# Patient Record
Sex: FEMALE | Race: BLACK OR AFRICAN AMERICAN | NOT HISPANIC OR LATINO | URBAN - METROPOLITAN AREA
[De-identification: names, ages, dates, MRNs, and addresses within clinical notes are randomized per-mention and may not be internally consistent; named-entity substitution may affect disease eponyms.]

---

## 2022-11-10 VITALS
HEART RATE: 55 BPM | OXYGEN SATURATION: 99 % | SYSTOLIC BLOOD PRESSURE: 151 MMHG | DIASTOLIC BLOOD PRESSURE: 70 MMHG | TEMPERATURE: 98 F | RESPIRATION RATE: 17 BRPM

## 2022-11-10 PROCEDURE — 93010 ELECTROCARDIOGRAM REPORT: CPT

## 2022-11-10 PROCEDURE — 99285 EMERGENCY DEPT VISIT HI MDM: CPT

## 2022-11-10 PROCEDURE — 99053 MED SERV 10PM-8AM 24 HR FAC: CPT

## 2022-11-10 NOTE — ED ADULT TRIAGE NOTE - CHIEF COMPLAINT QUOTE
Pt BIBA accompanied by daughter-in-law, pts daughter-in-law states pt has hx of dementia and is confused at baseline. Pts daughter-in-law reports pt was dropped off at her house last night to spend weekend with her but has not sleep for over 24 hours and has been increasingly confused and violet. Pt attempting to leaving daughter-in-law's apartment, daughter-in-law states "I didn't know what to do". Pt denies any pain or medical complaints.

## 2022-11-11 ENCOUNTER — INPATIENT (INPATIENT)
Facility: HOSPITAL | Age: 87
LOS: 5 days | Discharge: NURSING, REHAB OR LONG-TERM CARE | DRG: 177 | End: 2022-11-17
Admitting: STUDENT IN AN ORGANIZED HEALTH CARE EDUCATION/TRAINING PROGRAM
Payer: COMMERCIAL

## 2022-11-11 DIAGNOSIS — Z29.9 ENCOUNTER FOR PROPHYLACTIC MEASURES, UNSPECIFIED: ICD-10-CM

## 2022-11-11 DIAGNOSIS — I10 ESSENTIAL (PRIMARY) HYPERTENSION: ICD-10-CM

## 2022-11-11 DIAGNOSIS — U07.1 COVID-19: ICD-10-CM

## 2022-11-11 DIAGNOSIS — G30.9 ALZHEIMER'S DISEASE, UNSPECIFIED: ICD-10-CM

## 2022-11-11 DIAGNOSIS — G92.8 OTHER TOXIC ENCEPHALOPATHY: ICD-10-CM

## 2022-11-11 DIAGNOSIS — E78.5 HYPERLIPIDEMIA, UNSPECIFIED: ICD-10-CM

## 2022-11-11 DIAGNOSIS — I65.1 OCCLUSION AND STENOSIS OF BASILAR ARTERY: ICD-10-CM

## 2022-11-11 LAB
ALBUMIN SERPL ELPH-MCNC: 3.7 G/DL — SIGNIFICANT CHANGE UP (ref 3.3–5)
ALBUMIN SERPL ELPH-MCNC: 4.1 G/DL — SIGNIFICANT CHANGE UP (ref 3.4–5)
ALP SERPL-CCNC: 69 U/L — SIGNIFICANT CHANGE UP (ref 40–120)
ALP SERPL-CCNC: 88 U/L — SIGNIFICANT CHANGE UP (ref 40–120)
ALT FLD-CCNC: 9 U/L — LOW (ref 10–45)
ALT FLD-CCNC: 9 U/L — LOW (ref 12–42)
ANION GAP SERPL CALC-SCNC: 11 MMOL/L — SIGNIFICANT CHANGE UP (ref 5–17)
ANION GAP SERPL CALC-SCNC: 9 MMOL/L — SIGNIFICANT CHANGE UP (ref 9–16)
APAP SERPL-MCNC: <2 UG/ML — LOW (ref 10–30)
APPEARANCE UR: CLEAR — SIGNIFICANT CHANGE UP
APTT BLD: 27.7 SEC — SIGNIFICANT CHANGE UP (ref 27.5–35.5)
AST SERPL-CCNC: 17 U/L — SIGNIFICANT CHANGE UP (ref 15–37)
AST SERPL-CCNC: 18 U/L — SIGNIFICANT CHANGE UP (ref 10–40)
BASE EXCESS BLDA CALC-SCNC: 0.6 MMOL/L — SIGNIFICANT CHANGE UP (ref -2–3)
BASOPHILS # BLD AUTO: 0.01 K/UL — SIGNIFICANT CHANGE UP (ref 0–0.2)
BASOPHILS # BLD AUTO: 0.01 K/UL — SIGNIFICANT CHANGE UP (ref 0–0.2)
BASOPHILS NFR BLD AUTO: 0.2 % — SIGNIFICANT CHANGE UP (ref 0–2)
BASOPHILS NFR BLD AUTO: 0.3 % — SIGNIFICANT CHANGE UP (ref 0–2)
BILIRUB SERPL-MCNC: 0.8 MG/DL — SIGNIFICANT CHANGE UP (ref 0.2–1.2)
BILIRUB SERPL-MCNC: 0.9 MG/DL — SIGNIFICANT CHANGE UP (ref 0.2–1.2)
BILIRUB UR-MCNC: NEGATIVE — SIGNIFICANT CHANGE UP
BLD GP AB SCN SERPL QL: NEGATIVE — SIGNIFICANT CHANGE UP
BUN SERPL-MCNC: 13 MG/DL — SIGNIFICANT CHANGE UP (ref 7–23)
BUN SERPL-MCNC: 9 MG/DL — SIGNIFICANT CHANGE UP (ref 7–23)
CALCIUM SERPL-MCNC: 9.4 MG/DL — SIGNIFICANT CHANGE UP (ref 8.4–10.5)
CALCIUM SERPL-MCNC: 9.7 MG/DL — SIGNIFICANT CHANGE UP (ref 8.5–10.5)
CHLORIDE SERPL-SCNC: 107 MMOL/L — SIGNIFICANT CHANGE UP (ref 96–108)
CHLORIDE SERPL-SCNC: 110 MMOL/L — HIGH (ref 96–108)
CO2 BLDA-SCNC: 26 MMOL/L — HIGH (ref 19–24)
CO2 SERPL-SCNC: 24 MMOL/L — SIGNIFICANT CHANGE UP (ref 22–31)
CO2 SERPL-SCNC: 26 MMOL/L — SIGNIFICANT CHANGE UP (ref 22–31)
COLOR SPEC: YELLOW — SIGNIFICANT CHANGE UP
CREAT SERPL-MCNC: 0.73 MG/DL — SIGNIFICANT CHANGE UP (ref 0.5–1.3)
CREAT SERPL-MCNC: 0.87 MG/DL — SIGNIFICANT CHANGE UP (ref 0.5–1.3)
CRP SERPL-MCNC: <3 MG/L — SIGNIFICANT CHANGE UP (ref 0–4)
D DIMER BLD IA.RAPID-MCNC: 350 NG/ML DDU — HIGH
DIFF PNL FLD: ABNORMAL
EGFR: 65 ML/MIN/1.73M2 — SIGNIFICANT CHANGE UP
EGFR: 81 ML/MIN/1.73M2 — SIGNIFICANT CHANGE UP
EOSINOPHIL # BLD AUTO: 0.01 K/UL — SIGNIFICANT CHANGE UP (ref 0–0.5)
EOSINOPHIL # BLD AUTO: 0.04 K/UL — SIGNIFICANT CHANGE UP (ref 0–0.5)
EOSINOPHIL NFR BLD AUTO: 0.3 % — SIGNIFICANT CHANGE UP (ref 0–6)
EOSINOPHIL NFR BLD AUTO: 0.7 % — SIGNIFICANT CHANGE UP (ref 0–6)
FERRITIN SERPL-MCNC: 101 NG/ML — SIGNIFICANT CHANGE UP (ref 15–150)
GLUCOSE BLDC GLUCOMTR-MCNC: 115 MG/DL — HIGH (ref 70–99)
GLUCOSE BLDC GLUCOMTR-MCNC: 76 MG/DL — SIGNIFICANT CHANGE UP (ref 70–99)
GLUCOSE BLDC GLUCOMTR-MCNC: 80 MG/DL — SIGNIFICANT CHANGE UP (ref 70–99)
GLUCOSE SERPL-MCNC: 125 MG/DL — HIGH (ref 70–99)
GLUCOSE SERPL-MCNC: 95 MG/DL — SIGNIFICANT CHANGE UP (ref 70–99)
GLUCOSE UR QL: NEGATIVE — SIGNIFICANT CHANGE UP
HCO3 BLDA-SCNC: 24 MMOL/L — SIGNIFICANT CHANGE UP (ref 21–28)
HCT VFR BLD CALC: 31.7 % — LOW (ref 34.5–45)
HCT VFR BLD CALC: 38.1 % — SIGNIFICANT CHANGE UP (ref 34.5–45)
HGB BLD-MCNC: 10.6 G/DL — LOW (ref 11.5–15.5)
HGB BLD-MCNC: 12.6 G/DL — SIGNIFICANT CHANGE UP (ref 11.5–15.5)
HIV 1+2 AB+HIV1 P24 AG SERPL QL IA: SIGNIFICANT CHANGE UP
IMM GRANULOCYTES NFR BLD AUTO: 0 % — SIGNIFICANT CHANGE UP (ref 0–0.9)
IMM GRANULOCYTES NFR BLD AUTO: 0.2 % — SIGNIFICANT CHANGE UP (ref 0–0.9)
INR BLD: 1.16 — SIGNIFICANT CHANGE UP (ref 0.88–1.16)
KETONES UR-MCNC: NEGATIVE — SIGNIFICANT CHANGE UP
LACTATE SERPL-SCNC: 0.5 MMOL/L — SIGNIFICANT CHANGE UP (ref 0.5–2)
LDH SERPL L TO P-CCNC: 249 U/L — HIGH (ref 50–242)
LEUKOCYTE ESTERASE UR-ACNC: NEGATIVE — SIGNIFICANT CHANGE UP
LYMPHOCYTES # BLD AUTO: 1.2 K/UL — SIGNIFICANT CHANGE UP (ref 1–3.3)
LYMPHOCYTES # BLD AUTO: 2.41 K/UL — SIGNIFICANT CHANGE UP (ref 1–3.3)
LYMPHOCYTES # BLD AUTO: 35.6 % — SIGNIFICANT CHANGE UP (ref 13–44)
LYMPHOCYTES # BLD AUTO: 40.3 % — SIGNIFICANT CHANGE UP (ref 13–44)
MAGNESIUM SERPL-MCNC: 1.9 MG/DL — SIGNIFICANT CHANGE UP (ref 1.6–2.6)
MCHC RBC-ENTMCNC: 31 PG — SIGNIFICANT CHANGE UP (ref 27–34)
MCHC RBC-ENTMCNC: 31.1 PG — SIGNIFICANT CHANGE UP (ref 27–34)
MCHC RBC-ENTMCNC: 33.1 GM/DL — SIGNIFICANT CHANGE UP (ref 32–36)
MCHC RBC-ENTMCNC: 33.4 GM/DL — SIGNIFICANT CHANGE UP (ref 32–36)
MCV RBC AUTO: 93 FL — SIGNIFICANT CHANGE UP (ref 80–100)
MCV RBC AUTO: 93.6 FL — SIGNIFICANT CHANGE UP (ref 80–100)
MONOCYTES # BLD AUTO: 0.27 K/UL — SIGNIFICANT CHANGE UP (ref 0–0.9)
MONOCYTES # BLD AUTO: 0.44 K/UL — SIGNIFICANT CHANGE UP (ref 0–0.9)
MONOCYTES NFR BLD AUTO: 7.4 % — SIGNIFICANT CHANGE UP (ref 2–14)
MONOCYTES NFR BLD AUTO: 8 % — SIGNIFICANT CHANGE UP (ref 2–14)
NEUTROPHILS # BLD AUTO: 1.88 K/UL — SIGNIFICANT CHANGE UP (ref 1.8–7.4)
NEUTROPHILS # BLD AUTO: 3.07 K/UL — SIGNIFICANT CHANGE UP (ref 1.8–7.4)
NEUTROPHILS NFR BLD AUTO: 51.2 % — SIGNIFICANT CHANGE UP (ref 43–77)
NEUTROPHILS NFR BLD AUTO: 55.8 % — SIGNIFICANT CHANGE UP (ref 43–77)
NITRITE UR-MCNC: NEGATIVE — SIGNIFICANT CHANGE UP
NRBC # BLD: 0 /100 WBCS — SIGNIFICANT CHANGE UP (ref 0–0)
NRBC # BLD: 0 /100 WBCS — SIGNIFICANT CHANGE UP (ref 0–0)
PCO2 BLDA: 36 MMHG — SIGNIFICANT CHANGE UP (ref 32–45)
PH BLDA: 7.44 — SIGNIFICANT CHANGE UP (ref 7.35–7.45)
PH UR: 7 — SIGNIFICANT CHANGE UP (ref 5–8)
PHOSPHATE SERPL-MCNC: 4 MG/DL — SIGNIFICANT CHANGE UP (ref 2.5–4.5)
PLATELET # BLD AUTO: 245 K/UL — SIGNIFICANT CHANGE UP (ref 150–400)
PLATELET # BLD AUTO: 284 K/UL — SIGNIFICANT CHANGE UP (ref 150–400)
PO2 BLDA: 99 MMHG — SIGNIFICANT CHANGE UP (ref 83–108)
POTASSIUM SERPL-MCNC: 3.9 MMOL/L — SIGNIFICANT CHANGE UP (ref 3.5–5.3)
POTASSIUM SERPL-MCNC: 3.9 MMOL/L — SIGNIFICANT CHANGE UP (ref 3.5–5.3)
POTASSIUM SERPL-SCNC: 3.9 MMOL/L — SIGNIFICANT CHANGE UP (ref 3.5–5.3)
POTASSIUM SERPL-SCNC: 3.9 MMOL/L — SIGNIFICANT CHANGE UP (ref 3.5–5.3)
PROT SERPL-MCNC: 6.5 G/DL — SIGNIFICANT CHANGE UP (ref 6–8.3)
PROT SERPL-MCNC: 8.2 G/DL — SIGNIFICANT CHANGE UP (ref 6.4–8.2)
PROT UR-MCNC: NEGATIVE MG/DL — SIGNIFICANT CHANGE UP
PROTHROM AB SERPL-ACNC: 13.8 SEC — HIGH (ref 10.5–13.4)
RBC # BLD: 3.41 M/UL — LOW (ref 3.8–5.2)
RBC # BLD: 4.07 M/UL — SIGNIFICANT CHANGE UP (ref 3.8–5.2)
RBC # FLD: 13.8 % — SIGNIFICANT CHANGE UP (ref 10.3–14.5)
RBC # FLD: 14.1 % — SIGNIFICANT CHANGE UP (ref 10.3–14.5)
RBC CASTS # UR COMP ASSIST: < 5 /HPF — SIGNIFICANT CHANGE UP
RH IG SCN BLD-IMP: NEGATIVE — SIGNIFICANT CHANGE UP
SALICYLATES SERPL-MCNC: 0.6 MG/DL — LOW (ref 2.8–20)
SAO2 % BLDA: 99.4 % — HIGH (ref 94–98)
SARS-COV-2 RNA SPEC QL NAA+PROBE: DETECTED
SODIUM SERPL-SCNC: 142 MMOL/L — SIGNIFICANT CHANGE UP (ref 132–145)
SODIUM SERPL-SCNC: 145 MMOL/L — SIGNIFICANT CHANGE UP (ref 135–145)
SP GR SPEC: 1.01 — SIGNIFICANT CHANGE UP (ref 1–1.03)
TROPONIN I, HIGH SENSITIVITY RESULT: 20.9 NG/L — SIGNIFICANT CHANGE UP
TSH SERPL-MCNC: 3.41 UIU/ML — SIGNIFICANT CHANGE UP (ref 0.27–4.2)
TSH SERPL-MCNC: 4.32 UIU/ML — HIGH (ref 0.27–4.2)
UROBILINOGEN FLD QL: 0.2 E.U./DL — SIGNIFICANT CHANGE UP
WBC # BLD: 3.37 K/UL — LOW (ref 3.8–10.5)
WBC # BLD: 5.98 K/UL — SIGNIFICANT CHANGE UP (ref 3.8–10.5)
WBC # FLD AUTO: 3.37 K/UL — LOW (ref 3.8–10.5)
WBC # FLD AUTO: 5.98 K/UL — SIGNIFICANT CHANGE UP (ref 3.8–10.5)
WBC UR QL: < 5 /HPF — SIGNIFICANT CHANGE UP

## 2022-11-11 PROCEDURE — 70496 CT ANGIOGRAPHY HEAD: CPT | Mod: 26

## 2022-11-11 PROCEDURE — 99291 CRITICAL CARE FIRST HOUR: CPT | Mod: GC

## 2022-11-11 PROCEDURE — 0042T: CPT

## 2022-11-11 PROCEDURE — 99223 1ST HOSP IP/OBS HIGH 75: CPT | Mod: GC

## 2022-11-11 PROCEDURE — 70498 CT ANGIOGRAPHY NECK: CPT | Mod: 26

## 2022-11-11 PROCEDURE — 70450 CT HEAD/BRAIN W/O DYE: CPT | Mod: 26,77,59

## 2022-11-11 PROCEDURE — 71045 X-RAY EXAM CHEST 1 VIEW: CPT | Mod: 26

## 2022-11-11 PROCEDURE — 70450 CT HEAD/BRAIN W/O DYE: CPT | Mod: 26,59

## 2022-11-11 RX ORDER — ENOXAPARIN SODIUM 100 MG/ML
30 INJECTION SUBCUTANEOUS EVERY 24 HOURS
Refills: 0 | Status: DISCONTINUED | OUTPATIENT
Start: 2022-11-11 | End: 2022-11-11

## 2022-11-11 RX ORDER — DEXTROSE 50 % IN WATER 50 %
25 SYRINGE (ML) INTRAVENOUS ONCE
Refills: 0 | Status: DISCONTINUED | OUTPATIENT
Start: 2022-11-11 | End: 2022-11-17

## 2022-11-11 RX ORDER — NIFEDIPINE 30 MG
30 TABLET, EXTENDED RELEASE 24 HR ORAL DAILY
Refills: 0 | Status: DISCONTINUED | OUTPATIENT
Start: 2022-11-11 | End: 2022-11-11

## 2022-11-11 RX ORDER — REMDESIVIR 5 MG/ML
100 INJECTION INTRAVENOUS EVERY 24 HOURS
Refills: 0 | Status: COMPLETED | OUTPATIENT
Start: 2022-11-12 | End: 2022-11-15

## 2022-11-11 RX ORDER — ATORVASTATIN CALCIUM 80 MG/1
40 TABLET, FILM COATED ORAL AT BEDTIME
Refills: 0 | Status: DISCONTINUED | OUTPATIENT
Start: 2022-11-11 | End: 2022-11-11

## 2022-11-11 RX ORDER — OLANZAPINE 15 MG/1
5 TABLET, FILM COATED ORAL ONCE
Refills: 0 | Status: COMPLETED | OUTPATIENT
Start: 2022-11-11 | End: 2022-11-11

## 2022-11-11 RX ORDER — METOPROLOL TARTRATE 50 MG
25 TABLET ORAL DAILY
Refills: 0 | Status: DISCONTINUED | OUTPATIENT
Start: 2022-11-11 | End: 2022-11-11

## 2022-11-11 RX ORDER — DONEPEZIL HYDROCHLORIDE 10 MG/1
1 TABLET, FILM COATED ORAL
Qty: 0 | Refills: 0 | DISCHARGE

## 2022-11-11 RX ORDER — ENOXAPARIN SODIUM 100 MG/ML
30 INJECTION SUBCUTANEOUS EVERY 24 HOURS
Refills: 0 | Status: DISCONTINUED | OUTPATIENT
Start: 2022-11-11 | End: 2022-11-17

## 2022-11-11 RX ORDER — MIDAZOLAM HYDROCHLORIDE 1 MG/ML
2 INJECTION, SOLUTION INTRAMUSCULAR; INTRAVENOUS ONCE
Refills: 0 | Status: DISCONTINUED | OUTPATIENT
Start: 2022-11-11 | End: 2022-11-11

## 2022-11-11 RX ORDER — HYDRALAZINE HCL 50 MG
10 TABLET ORAL ONCE
Refills: 0 | Status: COMPLETED | OUTPATIENT
Start: 2022-11-11 | End: 2022-11-11

## 2022-11-11 RX ORDER — HYDRALAZINE HCL 50 MG
10 TABLET ORAL EVERY 6 HOURS
Refills: 0 | Status: DISCONTINUED | OUTPATIENT
Start: 2022-11-11 | End: 2022-11-13

## 2022-11-11 RX ORDER — SODIUM CHLORIDE 9 MG/ML
1000 INJECTION INTRAMUSCULAR; INTRAVENOUS; SUBCUTANEOUS ONCE
Refills: 0 | Status: COMPLETED | OUTPATIENT
Start: 2022-11-11 | End: 2022-11-11

## 2022-11-11 RX ORDER — DEXTROSE 50 % IN WATER 50 %
12.5 SYRINGE (ML) INTRAVENOUS ONCE
Refills: 0 | Status: DISCONTINUED | OUTPATIENT
Start: 2022-11-11 | End: 2022-11-17

## 2022-11-11 RX ORDER — SODIUM CHLORIDE 9 MG/ML
1000 INJECTION, SOLUTION INTRAVENOUS
Refills: 0 | Status: DISCONTINUED | OUTPATIENT
Start: 2022-11-11 | End: 2022-11-17

## 2022-11-11 RX ORDER — OLANZAPINE 15 MG/1
2.5 TABLET, FILM COATED ORAL EVERY 6 HOURS
Refills: 0 | Status: DISCONTINUED | OUTPATIENT
Start: 2022-11-11 | End: 2022-11-11

## 2022-11-11 RX ORDER — LOSARTAN POTASSIUM 100 MG/1
1 TABLET, FILM COATED ORAL
Qty: 0 | Refills: 0 | DISCHARGE

## 2022-11-11 RX ORDER — DIPHENHYDRAMINE HCL 50 MG
25 CAPSULE ORAL ONCE
Refills: 0 | Status: COMPLETED | OUTPATIENT
Start: 2022-11-11 | End: 2022-11-11

## 2022-11-11 RX ORDER — DONEPEZIL HYDROCHLORIDE 10 MG/1
10 TABLET, FILM COATED ORAL AT BEDTIME
Refills: 0 | Status: DISCONTINUED | OUTPATIENT
Start: 2022-11-11 | End: 2022-11-11

## 2022-11-11 RX ORDER — GLUCAGON INJECTION, SOLUTION 0.5 MG/.1ML
1 INJECTION, SOLUTION SUBCUTANEOUS ONCE
Refills: 0 | Status: DISCONTINUED | OUTPATIENT
Start: 2022-11-11 | End: 2022-11-17

## 2022-11-11 RX ORDER — REMDESIVIR 5 MG/ML
200 INJECTION INTRAVENOUS EVERY 24 HOURS
Refills: 0 | Status: COMPLETED | OUTPATIENT
Start: 2022-11-11 | End: 2022-11-11

## 2022-11-11 RX ORDER — LANOLIN ALCOHOL/MO/W.PET/CERES
5 CREAM (GRAM) TOPICAL AT BEDTIME
Refills: 0 | Status: DISCONTINUED | OUTPATIENT
Start: 2022-11-11 | End: 2022-11-11

## 2022-11-11 RX ORDER — HALOPERIDOL DECANOATE 100 MG/ML
2 INJECTION INTRAMUSCULAR EVERY 6 HOURS
Refills: 0 | Status: DISCONTINUED | OUTPATIENT
Start: 2022-11-11 | End: 2022-11-11

## 2022-11-11 RX ORDER — METOPROLOL TARTRATE 50 MG
1 TABLET ORAL
Qty: 0 | Refills: 0 | DISCHARGE

## 2022-11-11 RX ORDER — LABETALOL HCL 100 MG
5 TABLET ORAL ONCE
Refills: 0 | Status: DISCONTINUED | OUTPATIENT
Start: 2022-11-11 | End: 2022-11-11

## 2022-11-11 RX ORDER — ASPIRIN/CALCIUM CARB/MAGNESIUM 324 MG
81 TABLET ORAL DAILY
Refills: 0 | Status: DISCONTINUED | OUTPATIENT
Start: 2022-11-11 | End: 2022-11-11

## 2022-11-11 RX ORDER — HALOPERIDOL DECANOATE 100 MG/ML
5 INJECTION INTRAMUSCULAR ONCE
Refills: 0 | Status: COMPLETED | OUTPATIENT
Start: 2022-11-11 | End: 2022-11-11

## 2022-11-11 RX ORDER — METOPROLOL TARTRATE 50 MG
5 TABLET ORAL EVERY 6 HOURS
Refills: 0 | Status: DISCONTINUED | OUTPATIENT
Start: 2022-11-11 | End: 2022-11-11

## 2022-11-11 RX ORDER — HYDRALAZINE HCL 50 MG
10 TABLET ORAL EVERY 6 HOURS
Refills: 0 | Status: DISCONTINUED | OUTPATIENT
Start: 2022-11-11 | End: 2022-11-11

## 2022-11-11 RX ORDER — METOPROLOL TARTRATE 50 MG
5 TABLET ORAL EVERY 6 HOURS
Refills: 0 | Status: DISCONTINUED | OUTPATIENT
Start: 2022-11-11 | End: 2022-11-13

## 2022-11-11 RX ORDER — ENOXAPARIN SODIUM 100 MG/ML
40 INJECTION SUBCUTANEOUS EVERY 24 HOURS
Refills: 0 | Status: DISCONTINUED | OUTPATIENT
Start: 2022-11-11 | End: 2022-11-11

## 2022-11-11 RX ORDER — ROSUVASTATIN CALCIUM 5 MG/1
1 TABLET ORAL
Qty: 0 | Refills: 0 | DISCHARGE

## 2022-11-11 RX ORDER — ACETAMINOPHEN 500 MG
650 TABLET ORAL EVERY 6 HOURS
Refills: 0 | Status: DISCONTINUED | OUTPATIENT
Start: 2022-11-11 | End: 2022-11-11

## 2022-11-11 RX ORDER — LOSARTAN POTASSIUM 100 MG/1
50 TABLET, FILM COATED ORAL DAILY
Refills: 0 | Status: DISCONTINUED | OUTPATIENT
Start: 2022-11-11 | End: 2022-11-11

## 2022-11-11 RX ORDER — REMDESIVIR 5 MG/ML
INJECTION INTRAVENOUS
Refills: 0 | Status: DISCONTINUED | OUTPATIENT
Start: 2022-11-11 | End: 2022-11-11

## 2022-11-11 RX ORDER — OLANZAPINE 15 MG/1
2.5 TABLET, FILM COATED ORAL EVERY 6 HOURS
Refills: 0 | Status: DISCONTINUED | OUTPATIENT
Start: 2022-11-11 | End: 2022-11-13

## 2022-11-11 RX ORDER — DEXTROSE 50 % IN WATER 50 %
15 SYRINGE (ML) INTRAVENOUS ONCE
Refills: 0 | Status: DISCONTINUED | OUTPATIENT
Start: 2022-11-11 | End: 2022-11-17

## 2022-11-11 RX ORDER — ATORVASTATIN CALCIUM 80 MG/1
80 TABLET, FILM COATED ORAL AT BEDTIME
Refills: 0 | Status: DISCONTINUED | OUTPATIENT
Start: 2022-11-11 | End: 2022-11-11

## 2022-11-11 RX ORDER — ASPIRIN/CALCIUM CARB/MAGNESIUM 324 MG
1 TABLET ORAL
Qty: 0 | Refills: 0 | DISCHARGE

## 2022-11-11 RX ORDER — REMDESIVIR 5 MG/ML
INJECTION INTRAVENOUS
Refills: 0 | Status: COMPLETED | OUTPATIENT
Start: 2022-11-11 | End: 2022-11-15

## 2022-11-11 RX ADMIN — Medication 25 MILLIGRAM(S): at 03:06

## 2022-11-11 RX ADMIN — Medication 10 MILLIGRAM(S): at 05:17

## 2022-11-11 RX ADMIN — Medication 5 MILLIGRAM(S): at 19:00

## 2022-11-11 RX ADMIN — OLANZAPINE 5 MILLIGRAM(S): 15 TABLET, FILM COATED ORAL at 02:04

## 2022-11-11 RX ADMIN — MIDAZOLAM HYDROCHLORIDE 2 MILLIGRAM(S): 1 INJECTION, SOLUTION INTRAMUSCULAR; INTRAVENOUS at 04:21

## 2022-11-11 RX ADMIN — OLANZAPINE 2.5 MILLIGRAM(S): 15 TABLET, FILM COATED ORAL at 18:50

## 2022-11-11 RX ADMIN — Medication 30 MILLIGRAM(S): at 13:30

## 2022-11-11 RX ADMIN — ENOXAPARIN SODIUM 30 MILLIGRAM(S): 100 INJECTION SUBCUTANEOUS at 21:32

## 2022-11-11 RX ADMIN — OLANZAPINE 5 MILLIGRAM(S): 15 TABLET, FILM COATED ORAL at 07:42

## 2022-11-11 RX ADMIN — SODIUM CHLORIDE 200 MILLILITER(S): 9 INJECTION INTRAMUSCULAR; INTRAVENOUS; SUBCUTANEOUS at 13:32

## 2022-11-11 RX ADMIN — HALOPERIDOL DECANOATE 5 MILLIGRAM(S): 100 INJECTION INTRAMUSCULAR at 03:06

## 2022-11-11 RX ADMIN — Medication 10 MILLIGRAM(S): at 17:27

## 2022-11-11 RX ADMIN — REMDESIVIR 200 MILLIGRAM(S): 5 INJECTION INTRAVENOUS at 21:33

## 2022-11-11 RX ADMIN — SODIUM CHLORIDE 70 MILLILITER(S): 9 INJECTION, SOLUTION INTRAVENOUS at 15:55

## 2022-11-11 RX ADMIN — Medication 10 MILLIGRAM(S): at 21:32

## 2022-11-11 RX ADMIN — OLANZAPINE 5 MILLIGRAM(S): 15 TABLET, FILM COATED ORAL at 01:10

## 2022-11-11 NOTE — PROGRESS NOTE ADULT - PROBLEM SELECTOR PLAN 4
Resume home meds: On Rosuvastatin   - Interchanged to Atorvastatin 40 mg daily Home meds: Metoprolol, Nifedipine, losartan    - Started on IV meds given AMS and NPO status     - IV hydral 10mg q6hrs     - IV lopressor 5mg q6hrs     - If BPs are uncontrolled, can give enalaprilate 0.625mg q6hrs (can increase to 1.25mg q6hrs if needed).

## 2022-11-11 NOTE — PROGRESS NOTE ADULT - PROBLEM SELECTOR PLAN 2
the patient is currently asymptomatic and has never been vaccinated against covid. She will need treatment as she is a high risk patient    - F/u Feritin, CRP and LDH  - Consider starting remdesivir for 3 days the patient is currently asymptomatic and has never been vaccinated against covid. She will need treatment as she is a high risk patient.    - F/u Ferritin, CRP and LDH  - Started on remdesivir

## 2022-11-11 NOTE — H&P ADULT - NSHPSOCIALHISTORY_GEN_ALL_CORE
The patient lives with her son. Able to ambulate on her own without assistance at baseline. She uses the bathroom, can bathe herself but cannot cook for herself and does not usually say when she is hungry, she eats and drinks when food and water is given are put in front of her.     She does not smoke, use drugs. She occasionally drinks one can of Budweiser.

## 2022-11-11 NOTE — ED PROVIDER NOTE - PHYSICAL EXAMINATION
Physical Exam    Vital Signs: I have reviewed the initial vital signs.  Constitutional: well-nourished, appears stated age, no acute distress  Eyes: PERRLA, and symmetrical lids.  ENT: Neck supple with no adenopathy, moist MM.  Cardiovascular: regular rate, regular rhythm, well-perfused extremities  Respiratory: unlabored respiratory effort, clear to auscultation bilaterally  Gastrointestinal: soft, non-tender abdomen, no pulsatile mass  Musculoskeletal: supple neck, no lower extremity edema  Integumentary: warm, dry, no rash  Neurologic: awake, alert, cranial nerves II-XII grossly intact, extremities’ motor and sensory functions grossly intact  Psychiatric: A&Ox0

## 2022-11-11 NOTE — PROGRESS NOTE ADULT - PROBLEM SELECTOR PLAN 5
Resume home meds: Donepizil   - C/w donepizil 10 mg daily at bedtime Home meds: On Rosuvastatin   - Holding for now, can c/w Atorvastatin 40 mg daily when pt taking PO meds  - Start baby aspirin when able
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
## 2022-11-11 NOTE — H&P ADULT - PROBLEM SELECTOR PLAN 3
Resume home meds: On Rosuvastatin   - Interchanged to Atorvastatin 40 mg daily Resume home meds: Metoprolol, Nifedipine, losartan   - C/w Metoprolol 25mg daily,   - c/w Nifedipine 30mg daily   - C/w Losartan 50 mg daily

## 2022-11-11 NOTE — H&P ADULT - NSHPLABSRESULTS_GEN_ALL_CORE
LABS:  cret                        12.6   5.98  )-----------( 284      ( 11 Nov 2022 00:41 )             38.1     11-11    142  |  107  |  13  ----------------------------<  125<H>  3.9   |  26  |  0.87    Ca    9.7      11 Nov 2022 00:41    TPro  8.2  /  Alb  4.1  /  TBili  0.8  /  DBili  x   /  AST  17  /  ALT  9<L>  /  AlkPhos  88  11-11

## 2022-11-11 NOTE — H&P ADULT - HISTORY OF PRESENT ILLNESS
The following history was obtained from the patient's son and ex-daughter in law as the patient was unable to provide history due to her altered mental status.  88 y/o woman with PMH of Alzheimer's dementia, HLD, HTN presenting with 2 days of confusion, agitation and combativeness. At baseline the patient knows her name and recognizes her family but does not know the date or her home address. She was dropped off by her son 2 days ago to her ex-daughter in law's place as he was travelling. On 11/9 the patient was well, she had dinner at her daughter in law's place and went to bed but was unable to sleep. She remained awake, pacing and talking to herself. She remained awake on the night of 11/9 and did not sleep the next day. According to the daughter in law she was confused on the 11/10 and was attempting to leave the house and would become combative when restrained. At the time she did not complain of any SOB, CP, cough, palpitations, abdominal pain, N/V, diarrhea, constipation, hematochezia, hematuria. hematemesis. However, the daughter noted that had become more wobbly than usual and would need to use the wall to support herself when walking, she did not have any urinary incontinence and was going to the bathroom whenever she needed to urinate, without any frequency, urgency or dysuria. The patient did not sustain any trauma or falls during prior or during this acute change in mental status.     VS: T 98, 51-58 Hr, 204-148/63-90, 99% on RA  Labs: CBC and CMP wnl, U/A negative, COVID +  Imaging:   CXR: Cardiomegaly, thoracic aortic calcification. Lungs and mediastinum are unremarkable. Right shoulder degenerative changes.  CT head:   No acute intracranial hemorrhage, mass effect, or CT evidence of recent transcortical infarction.  Interventions: Hydral 10 mg, Olanzapine 5 mg x3, Haloperidol 5 mg x1, versed 2mg x1   The following history was obtained from the patient's son and ex-daughter in law as the patient was unable to provide history due to her altered mental status.  86 y/o woman with PMH of Alzheimer's dementia, HLD, HTN presenting with 2 days of confusion, agitation and combativeness. At baseline the patient knows her name and recognizes her family but does not know the date or her home address. She was dropped off by her son 2 days ago to her ex-daughter in law's place as he was travelling. On 11/9 the patient was well, she had dinner at her daughter in law's place and went to bed but was unable to sleep. She remained awake, pacing and talking to herself. She remained awake on the night of 11/9 and did not sleep the next day. According to the daughter in law she was confused on the 11/10 and was attempting to leave the house and would become combative when restrained. At the time she did not complain of any SOB, CP, cough, palpitations, abdominal pain, N/V, diarrhea, constipation, hematochezia, hematuria. hematemesis. However, the daughter noted that had become more wobbly than usual and would need to use the wall to support herself when walking, she did not have any urinary incontinence and was going to the bathroom whenever she needed to urinate, without any frequency, urgency or dysuria. The patient did not sustain any trauma or falls during prior or during this acute change in mental status.   She has not been vaccinated against covid.     VS: T 98, 51-58 Hr, 204-148/63-90, 99% on RA  Labs: CBC and CMP wnl, U/A negative, COVID +  Imaging:   CXR: Cardiomegaly, thoracic aortic calcification. Lungs and mediastinum are unremarkable. Right shoulder degenerative changes.  CT head:   No acute intracranial hemorrhage, mass effect, or CT evidence of recent transcortical infarction.  Interventions: Hydral 10 mg, Olanzapine 5 mg x3, Haloperidol 5 mg x1, versed 2mg x1

## 2022-11-11 NOTE — H&P ADULT - PROBLEM SELECTOR PLAN 4
Resume home meds: Donepizil   - C/w donepizil 10 mg daily at bedtime Resume home meds: On Rosuvastatin   - Interchanged to Atorvastatin 40 mg daily

## 2022-11-11 NOTE — H&P ADULT - PROBLEM SELECTOR PLAN 2
Resume home meds: Metoprolol, Nifedipine, losartan   - C/w Metoprolol 25mg daily,   - c/w Nifedipine 30mg daily   - C/w Losartan 50 mg daily the patient is currently asymptomatic and has never been vaccinated against covid. She will need treatment as she is a high risk patient    - F/u Feritin, CRP and LDH the patient is currently asymptomatic and has never been vaccinated against covid. She will need treatment as she is a high risk patient    - F/u Feritin, CRP and LDH  - Consider starting remdesivir for 3 days

## 2022-11-11 NOTE — PROGRESS NOTE ADULT - PROBLEM SELECTOR PLAN 7
F: D5 and 1/2 NS at 70cc/hr  E: Replete PRN   Diet: NPO  DVT: lovenox 30 mg qdaily   Dispo plan: [facility] -> Tele

## 2022-11-11 NOTE — H&P ADULT - NSHPPHYSICALEXAM_GEN_ALL_CORE
.  VITAL SIGNS:  T(C): 36.4 (11-11-22 @ 09:46), Max: 36.7 (11-10-22 @ 23:34)  T(F): 97.6 (11-11-22 @ 09:46), Max: 98.1 (11-11-22 @ 08:52)  HR: 64 (11-11-22 @ 09:46) (51 - 64)  BP: 178/67 (11-11-22 @ 09:46) (148/65 - 204/87)  BP(mean): --  RR: 18 (11-11-22 @ 09:46) (11 - 18)  SpO2: 100% (11-11-22 @ 09:46) (99% - 100%)  Wt(kg): --    PHYSICAL EXAM:    Constitutional: WDWN resting comfortably in bed; NAD  Head: NC/AT  Eyes: PERRL, EOMI, anicteric sclera  ENT: no nasal discharge; uvula midline, no oropharyngeal erythema or exudates; MMM  Neck: supple; no JVD or thyromegaly  Respiratory: CTA B/L; no W/R/R, no retractions  Cardiac: +S1/S2; RRR; no M/R/G; PMI non-displaced  Gastrointestinal: soft, NT/ND; no rebound or guarding; +BSx4  Genitourinary: normal external genitalia  Back: spine midline, no bony tenderness or step-offs; no CVAT B/L  Extremities: WWP, no clubbing or cyanosis; no peripheral edema  Musculoskeletal: NROM x4; no joint swelling, tenderness or erythema  Vascular: 2+ radial, femoral, DP/PT pulses B/L  Dermatologic: skin warm, dry and intact; no rashes, wounds, or scars  Lymphatic: no submandibular or cervical LAD  Neurologic: AAOx1; CNII-XII grossly intact; no focal deficits

## 2022-11-11 NOTE — PROVIDER CONTACT NOTE (OTHER) - ACTION/TREATMENT ORDERED:
Recommendations stated above done.  PT care escalated to step down unit for increased monitoring. Narcan and flumazenil administered.  PT care escalated to step down unit for increased monitoring.

## 2022-11-11 NOTE — PATIENT PROFILE ADULT - HAS THE PATIENT RECEIVED THE INFLUENZA VACCINE THIS SEASON?
unable to assess immunization status... Pt returned to ER after admit to 8U for major depressive episode and inability to care for herself. Prior to transfer to the 8U last night, she was given atenolol 50mg PO, Hydralazine 10mg IV x 2, and clonidine 0.1 for HTN with normalization of BP. She was also given Melatonin 3mg PO for insomnia per nursing notes. She returned to ER for stroke like sx and hypertensive urgency, and stroke code was called. No TPA per stoke team. 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 Per neuro, allow permissive HTN at this time. She was found to have a UTI and treated with ceftriaxone. Repeat labs sent. Pt noted to be bradycardic down to 30s/40s on the monitor so she was placed on pads. repeat EKGs show sinus paige with sinus arrhythmia and LVH. Pt denies CP, dizziness or SOB. She states she takes no meds at home for her hypertension and Utox was negative. [facility] fellow down to see pt and will follow during admission to [facility]/neuro. A TTE was ordered. Pt returned to ER after admit to 8U for major depressive episode and inability to care for herself. Prior to transfer to the 8U last night, she was given atenolol 50mg PO, Hydralazine 10mg IV x 2, and clonidine 0.1 for HTN with normalization of BP. She was also given Melatonin 3mg PO for insomnia per nursing notes. She returned to ER for stroke like sx and hypertensive urgency, and stroke code was called. No TPA per stoke team. 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 Ct head mild hydrocephalus for with Dr. Paul consulted NS. Per neuro, allow permissive HTN at this time. She was found to have a UTI and treated with ceftriaxone. Repeat labs sent. Pt noted to be bradycardic down to 30s/40s on the monitor so she was placed on pads. repeat EKGs show sinus paige with sinus arrhythmia and LVH. Pt denies CP, dizziness or SOB. She states she takes no meds at home for her hypertension and Utox was negative. C[facility]ds fellow down to see pt and will follow during admission to [facility]/neuro. A TTE was ordered.

## 2022-11-11 NOTE — ED PROVIDER NOTE - ATTENDING APP SHARED VISIT CONTRIBUTION OF CARE
I have seen the pt, reviewed all pertinent clinical data, and I agree with the documentation/care/plan executed by CHAY Reyes. 88 y/o F p/w AMS, increasing agitation, insomnia, and difficulty to direct over the past few days. No trauma/falls. No report of fever/cough. Hx unable to be taken from pt as she is 1) agitated and 2) altered/poor historian. VSS. No signs of trauma on exam, no focal deficits on neuro exam, keeps attempting to jump out of bed, given multiple rounds of IM sedation w/limited effect, though pt eventually became more calm. Labs and imaging unremarkable for acute pathology. Admitted for treatment of what is likely advancing dementia and will likely need placement. I have seen the pt, reviewed all pertinent clinical data, and I agree with the documentation/care/plan executed by CHAY Reyes. 88 y/o F p/w AMS, increasing agitation, insomnia, and difficulty to direct over the past few days. No trauma/falls. No report of fever/cough. Hx unable to be taken from pt as she is 1) agitated and 2) altered/poor historian. VSS. No signs of trauma on exam, no focal deficits on neuro exam, keeps attempting to jump out of bed, given multiple rounds of IM sedation w/limited effect, though pt eventually became more calm. + COVID, otherwise labs and imaging unremarkable for acute pathology. No hypoxia or increased work of breathing. Admitted for treatment of what is likely advancing dementia and will likely need placement.

## 2022-11-11 NOTE — PROGRESS NOTE ADULT - PROBLEM SELECTOR PLAN 6
F: 1L NS   E: Replete PRN   Diet: Dash diet   DVT: lovenox 40 mg qdaily  Dispo plan: F Home meds: Donepizil  - Holding, can c/w donepizil 10 mg daily at bedtime once pt taking PO meds

## 2022-11-11 NOTE — ED ADULT NURSE NOTE - NSIMPLEMENTINTERV_GEN_ALL_ED
Implemented All Fall with Harm Risk Interventions:  [place] to call system. Call bell, personal items and telephone within reach. Instruct patient to call for assistance. Room bathroom lighting operational. Non-slip footwear when patient is off stretcher. Physically safe environment: no spills, clutter or unnecessary equipment. Stretcher in lowest position, wheels locked, appropriate side rails in place. Provide visual cue, wrist band, yellow gown, etc. Monitor gait and stability. Monitor for mental status changes and reorient to person, place, and time. Review medications for side effects contributing to fall risk. Reinforce activity limits and safety measures with patient and family. Provide visual clues: red socks.
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
## 2022-11-11 NOTE — RAPID RESPONSE TEAM SUMMARY - NSSITUATIONBACKGROUNDRRT_GEN_ALL_CORE
87F PMH Alzheimer's dementia-baseline ambulatory AAO x 0-1 , HLD, HTN presenting with 2 days of confusion, agitation and combativeness, found to have Covid 19 ( unvaccinated)-saturating well on room air, given dementia pt not able to provide history, agitation in ED required -olanzapine 15 mg, haldol 5 mg and versed 2 mg. Rapid response called by nursing for unresponsives.  Arousable to sternal rub. FS 80s. Afebrile. HD stable, O2sat high 90s on RA. Given Narcan, Flumazenil and pt became more responsive. Stroke code called and pt taken to [facility] and upgraded to medicine telemetry for closer monitoring as still not back to baseline.     87F PMH Alzheimer's dementia-baseline ambulatory AAO x 0-1 , HLD, HTN presenting with 2 days of confusion, agitation and combativeness, found to have Covid 19 ( unvaccinated)-saturating well on room air, given dementia pt not able to provide history, agitation in ED required -olanzapine 15 mg, haldol 5 mg and versed 2 mg. Rapid response called by nursing for unresponsives.  Arousable to sternal rub. FS 80s. Afebrile. Sinus Bardy 50-60s, SBP 170s, O2sat high 90s on RA and protecting airway. Given Narcan, Flumazenil and pt became more responsive. Stroke code called and pt taken to [facility] and upgraded to medicine telemetry for closer monitoring as still not back to baseline.     87F PMH Alzheimer's dementia-baseline ambulatory AAO x 0-1 , HLD, HTN presenting with 2 days of confusion, agitation and combativeness, found to have Covid 19 ( unvaccinated)-saturating well on room air, given dementia pt not able to provide history, agitation in ED required - olanzapine 15 mg, haldol 5 mg, benadryl 25 IV X1 and versed 2 mg. Rapid response called by nursing for unresponsives.  Arousable to sternal rub. FS 80s. Afebrile. Sinus Bardy 50-60s, SBP 170s, O2sat high 90s on RA and protecting airway. No secretions. Given Narcan, Flumazenil and pt became more responsive. Stroke code called and pt taken to [facility] and upgraded to medicine telemetry for closer monitoring as still not back to baseline.    DDX: Oversedation vs Stroke vs COVID vs Seizure    87F PMH Alzheimer's dementia-baseline ambulatory AAO x 0-1 , HLD, HTN presenting with 2 days of confusion, agitation and combativeness, found to have Covid 19 ( unvaccinated)-saturating well on room air, given dementia pt not able to provide history, agitation in ED required - olanzapine 15 mg, haldol 5 mg, benadryl 25 IV X1 and versed 2 mg. Rapid response called by nursing for unresponsives.  Arousable to sternal rub. FS 80s. Afebrile. Sinus Bardy 50-60s, SBP 170s, O2sat high 90s on RA and protecting airway. No secretions. Given Narcan, Flumazenil and pt became more responsive. Stroke code called and pt taken to [facility] and upgraded to medicine telemetry for closer monitoring as still not back to baseline.    DDX: Oversedation vs Stroke vs COVID vs Seizure vs HTN

## 2022-11-11 NOTE — H&P ADULT - PROBLEM SELECTOR PLAN 5
F: 1L NS   E: Replete PRN   Diet:  diet   DVT: lovenox 40 mg qdaily  Dispo plan: RMF Resume home meds: Donepizil   - C/w donepizil 10 mg daily at bedtime
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
## 2022-11-11 NOTE — PROVIDER CONTACT NOTE (OTHER) - ASSESSMENT
Pupils reactive and equal, small not pinpoint.  elevated bp systolic above 180.  FS 80  Bradycardia.  No significant change in respi. status.  Positive gag reflex

## 2022-11-11 NOTE — PROGRESS NOTE ADULT - PROBLEM SELECTOR PLAN 1
The patient is baseline A&O x1. However she had acute worsening over the past 2 days   Her AMS is likely due to toxic metabolic encephalopathy given the absence of any structural causes on CT head   She has no history of drug use and minimal alcohol use history, making withdrawal less likely, she has had no trauma and CT did not show any hemorrhage, her CMP did not show any acute metabolic derangements, physical exam labs and VS not concerning for any CNS infections at this time. She not hypoxic at this time and is not requiring any oxygen. She will need assessment for vitamin deficiencies considering that she looks cachetic and may be undernourished, and drug use to rule out any intoxications.   The patient is COVID+ and her acute exacerbation may be a consequence of her infection     - F/u B12 levels   - F/u TSH levels   - F/u Utox   - F/u HIV  - F/u syphilus screen  - Melatonin for sleep 5mg   - Zyprexa 2.5 mg PRN for agitation (PO if amenable otherwise IM) The patient is baseline A&O x1. However she had acute worsening over the past 2 days   Her AMS is likely due to toxic metabolic encephalopathy given the absence of any structural causes on CT head   She has no history of drug use and minimal alcohol use history, making withdrawal less likely, she has had no trauma and CT did not show any hemorrhage, her CMP did not show any acute metabolic derangements, physical exam labs and VS not concerning for any CNS infections at this time. She not hypoxic at this time and is not requiring any oxygen. She will need assessment for vitamin deficiencies considering that she looks cachetic and may be undernourished, and drug use to rule out any intoxications.   The patient is COVID+ and her acute exacerbation may be a consequence of her infection     - F/u B12 levels   - F/u TSH levels   - F/u Utox   - F/u HIV  - F/u syphilus screen  - Melatonin for sleep 5mg   - For agitation, Zyprexa 2.5 mg IM PRN first line and Haldol 2mg IV second line The patient is baseline A&O x1. However she had acute worsening over the past 2 days. Workup for AMS so far negative for structural causes/ICH, no history of drug use, minimal alcohol use, no trauma history. CMP unremarkable for significant electrolyte abnormalities. No signs of CNS infection. The patient is COVID+ and unvaccinated, likely contributing to AMS. Code stroke called for decreased responsiveness, negative for acute stroke.    - F/u B12 levels, TSH, Utox, HIV  - f/u vEEG  - Melatonin for sleep 5mg   - For agitation, Zyprexa 2.5 mg IM PRN first line  - Avoid giving haldol for now given unclear history of LBD The patient is baseline A&O x1. However she had acute worsening over the past 2 days. Workup for AMS so far negative for structural causes/ICH, no history of drug use, minimal alcohol use, no trauma history. CMP unremarkable for significant electrolyte abnormalities. No signs of CNS infection. The patient is COVID+ and unvaccinated, likely contributing to AMS. Code stroke called for decreased responsiveness, negative for acute stroke. Admitting AMS etiology unclear, possibly 2/2 worsening dementia vs COVID. Acute decreased responsiveness likely 2/2 polypharmacy on top of other causes    - F/u B12 levels, TSH, Utox, HIV  - f/u vEEG  - Melatonin for sleep 5mg   - For agitation, Zyprexa 2.5 mg IM PRN first line  - Avoid giving haldol for now given unclear history of LBD
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
## 2022-11-11 NOTE — H&P ADULT - PROBLEM SELECTOR PLAN 1
The patient is baseline A&O x1. However she had acute worsening over the past 2 days   Her AMS is likely due to toxic metabolic encephalopathy given the absence of any structural causes on CT head   She has no history of drug use and minimal alcohol use history, making withdrawal less likely, she has had no trauma and CT did not show any hemorrhage, her CMP did not show any acute metabolic derangements, physical exam labs and VS not concerning for any CNS infections at this time. She not hypoxic at this time and is not requiring any oxygen. She will need assessment for vitamin deficiencies considering that she looks cachetic and may be undernourished, and drug use to rule out any intoxications.   The patient is COVID+ and her acute exacerbation may be a consequence of her infection     - F/u B12 levels   - F/u TSH levels   - F/u Utox   - F/u HIV (will need consent)   - F/u syphilus screen The patient is baseline A&O x1. However she had acute worsening over the past 2 days   Her AMS is likely due to toxic metabolic encephalopathy given the absence of any structural causes on CT head   She has no history of drug use and minimal alcohol use history, making withdrawal less likely, she has had no trauma and CT did not show any hemorrhage, her CMP did not show any acute metabolic derangements, physical exam labs and VS not concerning for any CNS infections at this time. She not hypoxic at this time and is not requiring any oxygen. She will need assessment for vitamin deficiencies considering that she looks cachetic and may be undernourished, and drug use to rule out any intoxications.   The patient is COVID+ and her acute exacerbation may be a consequence of her infection     - F/u B12 levels   - F/u TSH levels   - F/u Utox   - F/u HIV  - F/u syphilus screen  - Melatonin for sleep 5mg   - Zyprexa 2.5 mg PRN for agitation (PO if amenable otherwise IM)

## 2022-11-11 NOTE — ED PROVIDER NOTE - PROGRESS NOTE DETAILS
pt attempting to get out of bed and reusing to stay in bed, will provide sedation case discussed with son and daughter in law regarding placement in SNF pt not redirect and despite multiple doses of haldol and Zyprexa pt refusing to stay in bed will order 2 mg of versed

## 2022-11-11 NOTE — CONSULT NOTE ADULT - SUBJECTIVE AND OBJECTIVE BOX
**STROKE CODE CONSULT NOTE**    Last known well time/Time of onset of symptoms: unknown    HPI: 86 y/o woman with PMH of Alzheimer's dementia, HLD, HTN presenting with 2 days of confusion, agitation and combativeness. At baseline the patient knows her name and recognizes her family but does not know the date or her home address. She was dropped off by her son 2 days ago to her ex-daughter in law's place as he was travelling. On  the patient was well, she had dinner at her daughter in law's place and went to bed but was unable to sleep. She remained awake, pacing and talking to herself. She remained awake on the night of  and did not sleep the next day. According to the daughter in law she was confused on the 11/10 and was attempting to leave the house and would become combative when restrained. At the time she did not complain of any SOB, CP, cough, palpitations, abdominal pain, N/V, diarrhea, constipation, hematochezia, hematuria. hematemesis. However, the daughter noted that had become more wobbly than usual and would need to use the wall to support herself when walking, she did not have any urinary incontinence and was going to the bathroom whenever she needed to urinate, without any frequency, urgency or dysuria. The patient did not sustain any trauma or falls during prior or during this acute change in mental status.   She has not been vaccinated against covid.       87F PMH Alzheimer's dementia-baseline ambulatory AAO x 0-1 , HLD, HTN presenting with 2 days of confusion, agitation and combativeness, found to have Covid 19 ( unvaccinated)-saturating well on room air, given dementia pt not able to provide history, agitation in ED required - olanzapine 15 mg, haldol 5 mg, benadryl 25 IV X1 and versed 2 mg. Rapid response called by nursing for unresponsives.  Arousable to sternal rub. FS 80s. Afebrile. Sinus Bardy 50-60s, SBP 170s, O2sat high 90s on RA and protecting airway. No secretions. Given Narcan, Flumazenil and pt became more responsive. Stroke code called and pt taken to [facility] and upgraded to medicine telemetry for closer monitoring as still not back to baseline.      T(C): 36.2 (22 @ 15:08), Max: 36.7 (11-10-22 @ 23:34)  HR: 64 (22 @ 15:10) (51 - 64)  BP: 178/67 (22 @ 09:46) (148/65 - 204/87)  RR: 20 (22 @ 15:10) (11 - 20)  SpO2: 99% (22 @ 15:10) (99% - 100%)    PAST MEDICAL & SURGICAL HISTORY:      FAMILY HISTORY:      SOCIAL HISTORY:   Patient lives with *** at ***.   Smoking status:  Drinking:  Drug Use:     ROS: ***  Constitutional: No fever, weight loss or fatigue  Eyes: No eye pain, visual disturbances, or discharge  ENMT:  No difficulty hearing, tinnitus; No sinus or throat pain  Neck: No pain or stiffness  Respiratory: No cough, wheezing, chills or hemoptysis  Cardiovascular: No chest pain, palpitations, shortness of breath, or leg swelling  Gastrointestinal: No abdominal pain. No nausea, vomiting or hematemesis; No diarrhea or constipation. Nohematochezia.  Genitourinary: No dysuria, frequency, hematuria or incontinence  Neurological: As per HPI  Skin: No itching, burning, rashes or lesions   Endocrine: No heat or cold intolerance; No hair loss  Musculoskeletal: No joint pain or swelling; No muscle, back or extremity pain  Heme/Lymph: No easy bruising or bleeding gums    MEDICATIONS  (STANDING):  aspirin enteric coated 81 milliGRAM(s) Oral daily  atorvastatin 80 milliGRAM(s) Oral at bedtime  dextrose 5% + sodium chloride 0.45%. 1000 milliLiter(s) (70 mL/Hr) IV Continuous <Continuous>  donepezil 10 milliGRAM(s) Oral at bedtime  hydrALAZINE Injectable 10 milliGRAM(s) IV Push every 6 hours  losartan 50 milliGRAM(s) Oral daily  melatonin 5 milliGRAM(s) Oral at bedtime  metoprolol tartrate 25 milliGRAM(s) Oral daily  NIFEdipine XL 30 milliGRAM(s) Oral daily    MEDICATIONS  (PRN):  acetaminophen     Tablet .. 650 milliGRAM(s) Oral every 6 hours PRN Temp greater or equal to 38C (100.4F), Mild Pain (1 - 3)    Allergies    No Known Allergies    Intolerances      Vital Signs Last 24 Hrs  T(C): 36.2 (2022 15:08), Max: 36.7 (10 Nov 2022 23:34)  T(F): 97.2 (2022 15:08), Max: 98.1 (2022 08:52)  HR: 64 (2022 15:10) (51 - 64)  BP: 178/67 (2022 09:46) (148/65 - 204/87)  BP(mean): --  RR: 20 (2022 15:10) (11 - 20)  SpO2: 99% (2022 15:10) (99% - 100%)    Parameters below as of 2022 09:46  Patient On (Oxygen Delivery Method): room air        Physical exam:  Constitutional: No acute distress, conversant  Eyes: Anicteric sclerae, moist conjunctivae, see below for CNs  Neck: trachea midline, FROM, supple, no thyromegaly or lymphadenopathy  Cardiovascular: Regular rate and rhythm, no murmurs, rubs, or gallops. No carotid bruits.   Pulmonary: Anterior breath sounds clear bilaterally, no crackles or wheezing. No use of accessory muscles  GI: Abdomen soft, non-distended, non-tender  Extremities: Radial and DP pulses +2, no edema    Neurologic:  -Mental status: Awake, alert, oriented to person, place, and time. Speech is fluent with intact naming, repetition, and comprehension, no dysarthria. Recent and remote memory intact. Follows commands. Attention/concentration intact. Fund of knowledge appropriate.  -Cranial nerves:   II: Visual fields are full to confrontation.  III, IV, VI: Extraocular movements are intact without nystagmus. Pupils equally round and reactive to light  V:  Facial sensation V1-V3 equal and intact   VII: Face is symmetric with normal eye closure and smile  VIII: Hearing is bilaterally intact to finger rub  IX, X: Uvula is midline and soft palate rises symmetrically  XI: Head turning and shoulder shrug are intact.  XII: Tongue protrudes midline  Motor: Normal bulk and tone. No pronator drift. Strength bilateral upper extremity 5/5, bilateral lower extremities 5/5.  Rapid alternating movements intact and symmetric  Sensation: Intact to light touch bilaterally. No neglect or extinction on double simultaneous testing.  Coordination: No dysmetria on finger-to-nose and heel-to-shin bilaterally  Reflexes: Downgoing toes bilaterally   Gait: Narrow gait and steady    NIHSS: **** ASPECT Score: ***** ICH Score: ****** (GCS)    Fingerstick Blood Glucose: CAPILLARY BLOOD GLUCOSE      POCT Blood Glucose.: 80 mg/dL (2022 13:53)    LABS:                        10.6   3.37  )-----------( 245      ( 2022 14:13 )             31.7         145  |  110<H>  |  9   ----------------------------<  95  3.9   |  24  |  0.73    Ca    9.4      2022 14:13  Phos  4.0       Mg     1.9         TPro  6.5  /  Alb  3.7  /  TBili  0.9  /  DBili  x   /  AST  18  /  ALT  9<L>  /  AlkPhos  69  11    PT/INR - ( 2022 14:13 )   PT: 13.8 sec;   INR: 1.16          PTT - ( 2022 14:13 )  PTT:27.7 sec      Urinalysis Basic - ( 2022 00:41 )    Color: Yellow / Appearance: Clear / S.010 / pH: x  Gluc: x / Ketone: NEGATIVE  / Bili: NEGATIVE / Urobili: 0.2 E.U./dL   Blood: x / Protein: NEGATIVE mg/dL / Nitrite: NEGATIVE   Leuk Esterase: NEGATIVE / RBC: < 5 /HPF / WBC < 5 /HPF   Sq Epi: x / Non Sq Epi: x / Bacteria: x        RADIOLOGY & ADDITIONAL STUDIES:      -----------------------------------------------------------------------------------------------------------------  IV-tPA (Y/N):    ***                              Bolus time:    Alteplase Dose Verification w/ RN:  Reason IV-tPA not given: ***   **STROKE CODE CONSULT NOTE**    Last known well time/Time of onset of symptoms: unknown    Information obtained from H&P     HPI: 88 y/o woman with PMH of Alzheimer's dementia, HLD, HTN presenting with 2 days of confusion, agitation and combativeness. At baseline the patient knows her name and recognizes her family but does not know the date or her home address. She was dropped off by her son 2 days ago to her ex-daughter in law's place as he was travelling. On  the patient was well, she had dinner at her daughter in law's place and went to bed but was unable to sleep. She remained awake, pacing and talking to herself. She remained awake on the night of  and did not sleep the next day. According to the daughter in law she was confused on the 11/10 and was attempting to leave the house and would become combative when restrained. At the time she did not complain of any SOB, CP, cough, palpitations, abdominal pain, N/V, diarrhea, constipation, hematochezia, hematuria. hematemesis. However, the daughter noted that had become more wobbly than usual and would need to use the wall to support herself when walking, she did not have any urinary incontinence and was going to the bathroom whenever she needed to urinate, without any frequency, urgency or dysuria. The patient did not sustain any trauma or falls during prior or during this acute change in mental status.    Pt admitted for acute delirium from toxic metabolic encephalopathy in the setting covid infection. Pt was agitated at [facility] and was given olanzapine 15 mg, haldol 5 mg, benadryl 25 IV X1 and versed 2 mg. On arrival to [facility] rapid response called for unresponsiveness. Stroke code called for AMS, NIHSS 18, no focal deficits noted, eyes closed on my arrival with no verbal output, not following commands. Pt given Narcan, Flumazenil and pt became more responsive. 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     T(C): 36.2 (22 @ 15:08), Max: 36.7 (11-10-22 @ 23:34)  HR: 64 (22 @ 15:10) (51 - 64)  BP: 178/67 (22 @ 09:46) (148/65 - 204/87)  RR: 20 (22 @ 15:10) (11 - )  SpO2: 99% (22 @ 15:10) (99% - 100%)    PAST MEDICAL & SURGICAL HISTORY:      FAMILY HISTORY:    ROS:  Unable to be obtained    MEDICATIONS  (STANDING):  aspirin enteric coated 81 milliGRAM(s) Oral daily  atorvastatin 80 milliGRAM(s) Oral at bedtime  dextrose 5% + sodium chloride 0.45%. 1000 milliLiter(s) (70 mL/Hr) IV Continuous <Continuous>  donepezil 10 milliGRAM(s) Oral at bedtime  hydrALAZINE Injectable 10 milliGRAM(s) IV Push every 6 hours  losartan 50 milliGRAM(s) Oral daily  melatonin 5 milliGRAM(s) Oral at bedtime  metoprolol tartrate 25 milliGRAM(s) Oral daily  NIFEdipine XL 30 milliGRAM(s) Oral daily    MEDICATIONS  (PRN):  acetaminophen     Tablet .. 650 milliGRAM(s) Oral every 6 hours PRN Temp greater or equal to 38C (100.4F), Mild Pain (1 - 3)    Allergies    No Known Allergies    Intolerances      Vital Signs Last 24 Hrs  T(C): 36.2 (2022 15:08), Max: 36.7 (10 Nov 2022 23:34)  T(F): 97.2 (2022 15:08), Max: 98.1 (2022 08:52)  HR: 64 (2022 15:10) (51 - 64)  BP: 178/67 (2022 09:46) (148/65 - 204/87)  BP(mean): --  RR: 20 (2022 15:10) (11 - 20)  SpO2: 99% (2022 15:10) (99% - 100%)    Parameters below as of 2022 09:46  Patient On (Oxygen Delivery Method): room air        Physical exam:    Neurologic:  -Mental status: Eyes closed, grimaces to noxious with intermittently opening eyes, no verbal output, does not follow commands or track, mute  -Cranial nerves:   II: Blink to threat intact   III, IV, VI: Oculocephalic reflex intact. Pupils equally round and reactive to light  VII: Face appears symmetric  IX, X: Cough and gag intact  Motor/Sensation: localizes and antigravity x4 to noxious   Reflexes: Downgoing toes bilaterally       NIHSS: 18    Fingerstick Blood Glucose: CAPILLARY BLOOD GLUCOSE      POCT Blood Glucose.: 80 mg/dL (2022 13:53)    LABS:                        10.6   3.37  )-----------( 245      ( 2022 14:13 )             31.7         145  |  110<H>  |  9   ----------------------------<  95  3.9   |  24  |  0.73    Ca    9.4      2022 14:13  Phos  4.0       Mg     1.9         TPro  6.5  /  Alb  3.7  /  TBili  0.9  /  DBili  x   /  AST  18  /  ALT  9<L>  /  AlkPhos  69      PT/INR - ( 2022 14:13 )   PT: 13.8 sec;   INR: 1.16          PTT - ( 2022 14:13 )  PTT:27.7 sec      Urinalysis Basic - ( 2022 00:41 )    Color: Yellow / Appearance: Clear / S.010 / pH: x  Gluc: x / Ketone: NEGATIVE  / Bili: NEGATIVE / Urobili: 0.2 E.U./dL   Blood: x / Protein: NEGATIVE mg/dL / Nitrite: NEGATIVE   Leuk Esterase: NEGATIVE / RBC: < 5 /HPF / WBC < 5 /HPF   Sq Epi: x / Non Sq Epi: x / Bacteria: x        RADIOLOGY & ADDITIONAL STUDIES:    < from: CT Head No Cont (22 @ 01:28) >  IMPRESSION: No acute intracranial hemorrhage, mass effect, or CT evidence   of recent transcortical infarction.    < from: CT Perfusion w/ Maps w/ IV Cont (22 @ 14:51) >  IMPRESSION: Examination is degraded by motion. Perfusion metrics as above.      < from: CT Angio Brain Stroke Protocol  w/ IV Cont (22 @ 14:53) >  IMPRESSION:    Intracranial CTA: Intracranial left vertebral artery occlusion.   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    -----------------------------------------------------------------------------------------------------------------  IV-tPA (Y/N):  N                            Bolus time:    Alteplase Dose Verification w/ RN:  Reason IV-tPA not given: out of window

## 2022-11-11 NOTE — PROGRESS NOTE ADULT - SUBJECTIVE AND OBJECTIVE BOX
O/N Events:    Subjective/ROS: Patient seen and examined at bedside.     Denies Fever/Chills, HA, CP, SOB, n/v, changes in bowel/urinary habits.  12pt ROS otherwise negative.    VITALS  Vital Signs Last 24 Hrs  T(C): 36.2 (2022 15:08), Max: 36.7 (10 Nov 2022 23:34)  T(F): 97.2 (2022 15:08), Max: 98.1 (2022 08:52)  HR: 48 (2022 16:00) (47 - 64)  BP: 151/69 (2022 16:00) (140/71 - 204/87)  BP(mean): 99 (2022 16:00) (98 - 102)  RR: 13 (2022 16:00) (11 - 20)  SpO2: 100% (2022 16:00) (99% - 100%)    Parameters below as of 2022 15:55  Patient On (Oxygen Delivery Method): room air        CAPILLARY BLOOD GLUCOSE      POCT Blood Glucose.: 76 mg/dL (2022 16:36)  POCT Blood Glucose.: 80 mg/dL (2022 13:53)      PHYSICAL EXAM  General: NAD  Head: NC/AT; MMM; PERRL; EOMI;  Neck: Supple; no JVD  Respiratory: CTAB; no wheezes/rales/rhonchi  Cardiovascular: Regular rhythm/rate; S1/S2+, no murmurs, rubs gallops   Gastrointestinal: Soft; NTND; bowel sounds normal and present  Extremities: WWP; no edema/cyanosis  Neurological: A&Ox3, CNII-XII grossly intact; no obvious focal deficits    MEDICATIONS  (STANDING):  dextrose 5% + sodium chloride 0.45%. 1000 milliLiter(s) (70 mL/Hr) IV Continuous <Continuous>  enoxaparin Injectable 40 milliGRAM(s) SubCutaneous every 24 hours  hydrALAZINE Injectable 10 milliGRAM(s) IV Push every 6 hours  metoprolol tartrate Injectable 5 milliGRAM(s) IV Push every 6 hours  remdesivir  IVPB   IV Intermittent     MEDICATIONS  (PRN):  haloperidol    Injectable 2 milliGRAM(s) IV Push every 6 hours PRN agitation  OLANZapine Injectable 2.5 milliGRAM(s) IntraMuscular every 6 hours PRN agitation      No Known Allergies      LABS                        10.6   3.37  )-----------( 245      ( 2022 14:13 )             31.7         145  |  110<H>  |  9   ----------------------------<  95  3.9   |  24  |  0.73    Ca    9.4      2022 14:13  Phos  4.0       Mg     1.9         TPro  6.5  /  Alb  3.7  /  TBili  0.9  /  DBili  x   /  AST  18  /  ALT  9<L>  /  AlkPhos  69      PT/INR - ( 2022 14:13 )   PT: 13.8 sec;   INR: 1.16          PTT - ( 2022 14:13 )  PTT:27.7 sec  Urinalysis Basic - ( 2022 00:41 )    Color: Yellow / Appearance: Clear / S.010 / pH: x  Gluc: x / Ketone: NEGATIVE  / Bili: NEGATIVE / Urobili: 0.2 E.U./dL   Blood: x / Protein: NEGATIVE mg/dL / Nitrite: NEGATIVE   Leuk Esterase: NEGATIVE / RBC: < 5 /HPF / WBC < 5 /HPF   Sq Epi: x / Non Sq Epi: x / Bacteria: x              IMAGING/EKG/ETC   ***** TRANSFER FROM [facility] TO [facility] *******    HOSPITAL COURSE:  87F PMH Alzheimer's dementia-baseline ambulatory AAO x 0-1 , HLD, HTN presenting with 2 days of confusion, agitation and combativeness, found to have Covid 19 ( unvaccinated). Given dementia, history was obtained from patient's family. Pt was last noted to be at her baseline on , but was unable to sleep that night, and started to appear confused on 11/10. Became combative when she was restrained from leaving the house. Admitted for metabolic encephalopathy 2/2 unclear etiology, COVID infection may be contributing. Pt          pt not able to provide history, agitation in ED required - olanzapine 15 mg, haldol 5 mg, benadryl 25 IV X1 and versed 2 mg. Rapid response called by nursing for unresponsives.  Arousable to sternal rub. FS 80s. Afebrile. Sinus Bard[place] 50-60s, SBP 170s, O2sat high 90s on RA and protecting airway. No secretions. Given Narcan, Flumazenil and pt became more responsive. Stroke code called and pt taken to [facility] and upgraded to medicine telemetry for closer monitoring as still not back to baseline.      At baseline the patient knows her name and recognizes her family but does not know the date or her home address. She was dropped off by her son 2 days ago to her ex-daughter in law's place as he was travelling. On  the patient was well, she had dinner at her daughter in law's place and went to bed but was unable to sleep. She remained awake, pacing and talking to herself. She remained awake on the night of  and did not sleep the next day. According to the daughter in law she was confused on the 11/10 and was attempting to leave the house and would become combative when restrained. At the time she did not complain of any SOB, CP, cough, palpitations, abdominal pain, N/V, diarrhea, constipation, hematochezia, hematuria. hematemesis. However, the daughter noted that had become more wobbly than usual and would need to use the wall to support herself when walking, she did not have any urinary incontinence and was going to the bathroom whenever she needed to urinate, without any frequency, urgency or dysuria. The patient did not sustain any trauma or falls during prior or during this acute change in mental status.   She has not been vaccinated against covid.       O/N Events:    Subjective/ROS: Patient seen and examined at bedside.     Denies Fever/Chills, HA, CP, SOB, n/v, changes in bowel/urinary habits.  12pt ROS otherwise negative.    VITALS  Vital Signs Last 24 Hrs  T(C): 36.2 (2022 15:08), Max: 36.7 (10 Nov 2022 23:34)  T(F): 97.2 (2022 15:08), Max: 98.1 (2022 08:52)  HR: 48 (2022 16:00) (47 - 64)  BP: 151/69 (2022 16:00) (140/71 - 204/87)  BP(mean): 99 (2022 16:00) (98 - 102)  RR: 13 (2022 16:00) (11 - 20)  SpO2: 100% (2022 16:00) (99% - 100%)    Parameters below as of 2022 15:55  Patient On (Oxygen Delivery Method): room air        CAPILLARY BLOOD GLUCOSE      POCT Blood Glucose.: 76 mg/dL (2022 16:36)  POCT Blood Glucose.: 80 mg/dL (2022 13:53)      PHYSICAL EXAM  General: NAD  Head: NC/AT; MMM; PERRL; EOMI;  Neck: Supple; no JVD  Respiratory: CTAB; no wheezes/rales/rhonchi  Cardiovascular: Regular rhythm/rate; S1/S2+, no murmurs, rubs gallops   Gastrointestinal: Soft; NTND; bowel sounds normal and present  Extremities: WWP; no edema/cyanosis  Neurological: A&Ox3, CNII-XII grossly intact; no obvious focal deficits    MEDICATIONS  (STANDING):  dextrose 5% + sodium chloride 0.45%. 1000 milliLiter(s) (70 mL/Hr) IV Continuous <Continuous>  enoxaparin Injectable 40 milliGRAM(s) SubCutaneous every 24 hours  hydrALAZINE Injectable 10 milliGRAM(s) IV Push every 6 hours  metoprolol tartrate Injectable 5 milliGRAM(s) IV Push every 6 hours  remdesivir  IVPB   IV Intermittent     MEDICATIONS  (PRN):  haloperidol    Injectable 2 milliGRAM(s) IV Push every 6 hours PRN agitation  OLANZapine Injectable 2.5 milliGRAM(s) IntraMuscular every 6 hours PRN agitation      No Known Allergies      LABS                        10.6   3.37  )-----------( 245      ( 2022 14:13 )             31.7         145  |  110<H>  |  9   ----------------------------<  95  3.9   |  24  |  0.73    Ca    9.4      2022 14:13  Phos  4.0       Mg     1.9         TPro  6.5  /  Alb  3.7  /  TBili  0.9  /  DBili  x   /  AST  18  /  ALT  9<L>  /  AlkPhos  69      PT/INR - ( 2022 14:13 )   PT: 13.8 sec;   INR: 1.16          PTT - ( 2022 14:13 )  PTT:27.7 sec  Urinalysis Basic - ( 2022 00:41 )    Color: Yellow / Appearance: Clear / S.010 / pH: x  Gluc: x / Ketone: NEGATIVE  / Bili: NEGATIVE / Urobili: 0.2 E.U./dL   Blood: x / Protein: NEGATIVE mg/dL / Nitrite: NEGATIVE   Leuk Esterase: NEGATIVE / RBC: < 5 /HPF / WBC < 5 /HPF   Sq Epi: x / Non Sq Epi: x / Bacteria: x              IMAGING/EKG/ETC   ***** TRANSFER FROM [facility] TO [facility] *******    HOSPITAL COURSE:  87F PMH Alzheimer's dementia-baseline ambulatory AAO x 0-1 , HLD, HTN presenting with 2 days of confusion, agitation and combativeness, found to have Covid 19 ( unvaccinated). Given dementia, history was obtained from patient's family. Pt was last noted to be at her baseline on , but was unable to sleep that night, and started to appear confused on 11/10. Became combative when she was restrained from leaving the house. Admitted for metabolic encephalopathy 2/2 unclear etiology, COVID infection may be contributing. CT head negative, no history of drug use and minimal alcohol use, no head trauma, no metabolic abnormalities on CMP. On , rapid response called for unresponsiveness, arousable to sternal rub. Some improvement with narcan and flumazenil. Code stroke called, workup negative. Admitted to [facility] for monitoring as patient has not returned to baseline.     Subjective/ROS: Patient seen and examined at bedside. Pt in bed and uncooperative with exam. She is making purposeful movements, but does not interact with medical team during exam and interview.    ROS unable to be obtained.    VITALS  Vital Signs Last 24 Hrs  T(C): 36.2 (2022 15:08), Max: 36.7 (10 Nov 2022 23:34)  T(F): 97.2 (2022 15:08), Max: 98.1 (2022 08:52)  HR: 48 (2022 16:00) (47 - 64)  BP: 151/69 (2022 16:00) (140/71 - 204/87)  BP(mean): 99 (2022 16:00) (98 - 102)  RR: 13 (2022 16:00) (11 - 20)  SpO2: 100% (2022 16:00) (99% - 100%)    Parameters below as of 2022 15:55  Patient On (Oxygen Delivery Method): room air        CAPILLARY BLOOD GLUCOSE      POCT Blood Glucose.: 76 mg/dL (2022 16:36)  POCT Blood Glucose.: 80 mg/dL (2022 13:53)      PHYSICAL EXAM  Constitutional: Thin and frail appearing, mild tremors at rest, does not interact with author during exam or interview.  Head: NC/AT  Neck: supple; no JVD or thyromegaly  Respiratory: CTA B/L; no W/R/R, no retractions  Cardiac: +S1/S2; RRR; no M/R/G  Gastrointestinal: soft, NT/ND; no rebound or guarding; +BSx4  Extremities: WWP, no clubbing or cyanosis; no peripheral edema  Vascular: 2+ radial, femoral, DP/PT pulses B/L  Dermatologic: skin warm, dry and intact; no rashes, wounds, or scars  Neurologic: AAOx0; no focal deficits    MEDICATIONS  (STANDING):  dextrose 5% + sodium chloride 0.45%. 1000 milliLiter(s) (70 mL/Hr) IV Continuous <Continuous>  enoxaparin Injectable 40 milliGRAM(s) SubCutaneous every 24 hours  hydrALAZINE Injectable 10 milliGRAM(s) IV Push every 6 hours  metoprolol tartrate Injectable 5 milliGRAM(s) IV Push every 6 hours  remdesivir  IVPB   IV Intermittent     MEDICATIONS  (PRN):  haloperidol    Injectable 2 milliGRAM(s) IV Push every 6 hours PRN agitation  OLANZapine Injectable 2.5 milliGRAM(s) IntraMuscular every 6 hours PRN agitation      No Known Allergies      LABS                        10.6   3.37  )-----------( 245      ( 2022 14:13 )             31.7         145  |  110<H>  |  9   ----------------------------<  95  3.9   |  24  |  0.73    Ca    9.4      2022 14:13  Phos  4.0       Mg     1.9         TPro  6.5  /  Alb  3.7  /  TBili  0.9  /  DBili  x   /  AST  18  /  ALT  9<L>  /  AlkPhos  69  11-11    PT/INR - ( 2022 14:13 )   PT: 13.8 sec;   INR: 1.16          PTT - ( 2022 14:13 )  PTT:27.7 sec  Urinalysis Basic - ( 2022 00:41 )    Color: Yellow / Appearance: Clear / S.010 / pH: x  Gluc: x / Ketone: NEGATIVE  / Bili: NEGATIVE / Urobili: 0.2 E.U./dL   Blood: x / Protein: NEGATIVE mg/dL / Nitrite: NEGATIVE   Leuk Esterase: NEGATIVE / RBC: < 5 /HPF / WBC < 5 /HPF   Sq Epi: x / Non Sq Epi: x / Bacteria: x              IMAGING/EKG/ETC   ***** TRANSFER FROM [facility] TO [facility] *******    HOSPITAL COURSE:  87F PMH Alzheimer's dementia (No lewy body dementia per daughter-in-law, but she is not pt's primary caretaker) baseline ambulatory AAO x 0-1 , HLD, HTN presenting with 2 days of confusion, agitation and combativeness, found to have Covid 19 ( unvaccinated). Given dementia, history was obtained from patient's family. Pt was last noted to be at her baseline on , but was unable to sleep that night, and started to appear confused on 11/10. Became combative when she was restrained from leaving the house. Admitted for metabolic encephalopathy 2/2 unclear etiology, COVID infection may be contributing. CT head negative, no history of drug use and minimal alcohol use, no head trauma, no metabolic abnormalities on CMP. On , rapid response called for unresponsiveness, arousable to sternal rub. Some improvement with narcan and flumazenil. Code stroke called, workup negative. Admitted to [facility] for monitoring as patient has not returned to baseline.     Subjective/ROS: Patient seen and examined at bedside. Pt in bed and uncooperative with exam. She is making purposeful movements, but does not interact with medical team during exam and interview.    ROS unable to be obtained.    VITALS  Vital Signs Last 24 Hrs  T(C): 36.2 (2022 15:08), Max: 36.7 (10 Nov 2022 23:34)  T(F): 97.2 (2022 15:08), Max: 98.1 (2022 08:52)  HR: 48 (2022 16:00) (47 - 64)  BP: 151/69 (2022 16:00) (140/71 - 204/87)  BP(mean): 99 (2022 16:00) (98 - 102)  RR: 13 (2022 16:00) (11 - 20)  SpO2: 100% (2022 16:00) (99% - 100%)    Parameters below as of 2022 15:55  Patient On (Oxygen Delivery Method): room air      CAPILLARY BLOOD GLUCOSE    POCT Blood Glucose.: 76 mg/dL (2022 16:36)  POCT Blood Glucose.: 80 mg/dL (2022 13:53)      PHYSICAL EXAM  Constitutional: Thin and frail appearing, mild tremors at rest, does not interact with author during exam or interview.  Head: NC/AT  Neck: supple; no JVD or thyromegaly  Respiratory: CTA B/L; no W/R/R, no retractions  Cardiac: +S1/S2; RRR; no M/R/G  Gastrointestinal: soft, NT/ND; no rebound or guarding; +BSx4  Extremities: WWP, no clubbing or cyanosis; no peripheral edema  Vascular: 2+ radial, femoral, DP/PT pulses B/L  Dermatologic: skin warm, dry and intact; no rashes, wounds, or scars  Neurologic: AAOx0; no focal deficits    MEDICATIONS  (STANDING):  dextrose 5% + sodium chloride 0.45%. 1000 milliLiter(s) (70 mL/Hr) IV Continuous <Continuous>  enoxaparin Injectable 40 milliGRAM(s) SubCutaneous every 24 hours  hydrALAZINE Injectable 10 milliGRAM(s) IV Push every 6 hours  metoprolol tartrate Injectable 5 milliGRAM(s) IV Push every 6 hours  remdesivir  IVPB   IV Intermittent     MEDICATIONS  (PRN):  haloperidol    Injectable 2 milliGRAM(s) IV Push every 6 hours PRN agitation  OLANZapine Injectable 2.5 milliGRAM(s) IntraMuscular every 6 hours PRN agitation      No Known Allergies      LABS                        10.6   3.37  )-----------( 245      ( 2022 14:13 )             31.7         145  |  110<H>  |  9   ----------------------------<  95  3.9   |  24  |  0.73    Ca    9.4      2022 14:13  Phos  4.0       Mg     1.9         TPro  6.5  /  Alb  3.7  /  TBili  0.9  /  DBili  x   /  AST  18  /  ALT  9<L>  /  AlkPhos  69      PT/INR - ( 2022 14:13 )   PT: 13.8 sec;   INR: 1.16          PTT - ( 2022 14:13 )  PTT:27.7 sec  Urinalysis Basic - ( 2022 00:41 )    Color: Yellow / Appearance: Clear / S.010 / pH: x  Gluc: x / Ketone: NEGATIVE  / Bili: NEGATIVE / Urobili: 0.2 E.U./dL   Blood: x / Protein: NEGATIVE mg/dL / Nitrite: NEGATIVE   Leuk Esterase: NEGATIVE / RBC: < 5 /HPF / WBC < 5 /HPF   Sq Epi: x / Non Sq Epi: x / Bacteria: x              IMAGING/EKG/ETC

## 2022-11-11 NOTE — PROGRESS NOTE ADULT - ASSESSMENT
86 y/o woman with PMH of Alzheimer's dementia. HLD, HTN presenting with 2 days of confusion, agitation likely in the setting of acute delirium from toxic metabolic encephalopathy in the setting covid infection

## 2022-11-11 NOTE — H&P ADULT - ASSESSMENT
88 y/o woman with PMH of Alzheimer's dementia. HLD, HTN presenting with 2 days of confusion, agitation likely in the setting of acute delirium from toxic metabolic encephalopathy in the setting covid infection     ldh   ferritin   crp     elevated CRP and ferritin level can have nre serious covid   more hx abt vaccination (if vaccinated but not boosted)   Get social work involved     will need stuff to sleep - Melatonin at 9 pm   for agitation PRN meds - Zyprexa 2.5 mg q6 PO and zyprexa IM 2.5mg if PO can't be taken (notify MD) if PO can give   Elope risk - watch from the door   86 y/o woman with PMH of Alzheimer's dementia. HLD, HTN presenting with 2 days of confusion, agitation likely in the setting of acute delirium from toxic metabolic encephalopathy in the setting covid infection

## 2022-11-11 NOTE — PROGRESS NOTE ADULT - PROBLEM SELECTOR PLAN 3
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 Per stroke team, likely chronic in nature.    - Start high intensity statin and baby aspirin when pt able to take PO meds

## 2022-11-11 NOTE — PATIENT PROFILE ADULT - FALL HARM RISK - HARM RISK INTERVENTIONS
Assistance with ambulation/Assistance OOB with selected safe patient handling equipment/Communicate Risk of Fall with Harm to all staff/Monitor for mental status changes/Move patient closer to nurses' station/Reinforce activity limits and safety measures with patient and family/Reorient to person, place and time as needed/Tailored Fall Risk Interventions/Toileting schedule using arm’s reach rule for commode and bathroom/Use of alarms - bed, chair and/or voice tab/Visual Cue: Yellow wristband and red socks/Bed in lowest position, wheels locked, appropriate side rails in place/Call bell, personal items and telephone in reach/Instruct patient to call for assistance before getting out of bed or chair/Non-slip footwear when patient is out of bed/[place] to call system/Physically safe environment - no spills, clutter or unnecessary equipment/Purposeful Proactive Rounding/Room/bathroom lighting operational, light cord in reach

## 2022-11-11 NOTE — PATIENT PROFILE ADULT - FUNCTIONAL ASSESSMENT - BASIC MOBILITY 6.
3-calculated by average/Not able to assess (calculate score using [facility] averaging method)  1-calculated by average/Not able to assess (calculate score using [facility] averaging method)

## 2022-11-12 LAB
A1C WITH ESTIMATED AVERAGE GLUCOSE RESULT: 4.9 % — SIGNIFICANT CHANGE UP (ref 4–5.6)
ALBUMIN SERPL ELPH-MCNC: 3.9 G/DL — SIGNIFICANT CHANGE UP (ref 3.3–5)
ALP SERPL-CCNC: 70 U/L — SIGNIFICANT CHANGE UP (ref 40–120)
ALT FLD-CCNC: 10 U/L — SIGNIFICANT CHANGE UP (ref 10–45)
ANION GAP SERPL CALC-SCNC: 13 MMOL/L — SIGNIFICANT CHANGE UP (ref 5–17)
AST SERPL-CCNC: 18 U/L — SIGNIFICANT CHANGE UP (ref 10–40)
BASOPHILS # BLD AUTO: 0.01 K/UL — SIGNIFICANT CHANGE UP (ref 0–0.2)
BASOPHILS NFR BLD AUTO: 0.2 % — SIGNIFICANT CHANGE UP (ref 0–2)
BILIRUB SERPL-MCNC: 0.7 MG/DL — SIGNIFICANT CHANGE UP (ref 0.2–1.2)
BUN SERPL-MCNC: 7 MG/DL — SIGNIFICANT CHANGE UP (ref 7–23)
CALCIUM SERPL-MCNC: 9.4 MG/DL — SIGNIFICANT CHANGE UP (ref 8.4–10.5)
CHLORIDE SERPL-SCNC: 109 MMOL/L — HIGH (ref 96–108)
CO2 SERPL-SCNC: 21 MMOL/L — LOW (ref 22–31)
CREAT SERPL-MCNC: 0.76 MG/DL — SIGNIFICANT CHANGE UP (ref 0.5–1.3)
EGFR: 77 ML/MIN/1.73M2 — SIGNIFICANT CHANGE UP
EOSINOPHIL # BLD AUTO: 0.01 K/UL — SIGNIFICANT CHANGE UP (ref 0–0.5)
EOSINOPHIL NFR BLD AUTO: 0.2 % — SIGNIFICANT CHANGE UP (ref 0–6)
ESTIMATED AVERAGE GLUCOSE: 94 MG/DL — SIGNIFICANT CHANGE UP (ref 68–114)
GLUCOSE BLDC GLUCOMTR-MCNC: 128 MG/DL — HIGH (ref 70–99)
GLUCOSE BLDC GLUCOMTR-MCNC: 98 MG/DL — SIGNIFICANT CHANGE UP (ref 70–99)
GLUCOSE SERPL-MCNC: 112 MG/DL — HIGH (ref 70–99)
HCT VFR BLD CALC: 35.3 % — SIGNIFICANT CHANGE UP (ref 34.5–45)
HGB BLD-MCNC: 11.6 G/DL — SIGNIFICANT CHANGE UP (ref 11.5–15.5)
IMM GRANULOCYTES NFR BLD AUTO: 0.3 % — SIGNIFICANT CHANGE UP (ref 0–0.9)
LYMPHOCYTES # BLD AUTO: 0.83 K/UL — LOW (ref 1–3.3)
LYMPHOCYTES # BLD AUTO: 13.7 % — SIGNIFICANT CHANGE UP (ref 13–44)
MAGNESIUM SERPL-MCNC: 1.9 MG/DL — SIGNIFICANT CHANGE UP (ref 1.6–2.6)
MCHC RBC-ENTMCNC: 31.2 PG — SIGNIFICANT CHANGE UP (ref 27–34)
MCHC RBC-ENTMCNC: 32.9 GM/DL — SIGNIFICANT CHANGE UP (ref 32–36)
MCV RBC AUTO: 94.9 FL — SIGNIFICANT CHANGE UP (ref 80–100)
MONOCYTES # BLD AUTO: 0.34 K/UL — SIGNIFICANT CHANGE UP (ref 0–0.9)
MONOCYTES NFR BLD AUTO: 5.6 % — SIGNIFICANT CHANGE UP (ref 2–14)
NEUTROPHILS # BLD AUTO: 4.86 K/UL — SIGNIFICANT CHANGE UP (ref 1.8–7.4)
NEUTROPHILS NFR BLD AUTO: 80 % — HIGH (ref 43–77)
NRBC # BLD: 0 /100 WBCS — SIGNIFICANT CHANGE UP (ref 0–0)
PHOSPHATE SERPL-MCNC: 3.8 MG/DL — SIGNIFICANT CHANGE UP (ref 2.5–4.5)
PLATELET # BLD AUTO: 248 K/UL — SIGNIFICANT CHANGE UP (ref 150–400)
POTASSIUM SERPL-MCNC: 3.8 MMOL/L — SIGNIFICANT CHANGE UP (ref 3.5–5.3)
POTASSIUM SERPL-SCNC: 3.8 MMOL/L — SIGNIFICANT CHANGE UP (ref 3.5–5.3)
PROT SERPL-MCNC: 6.8 G/DL — SIGNIFICANT CHANGE UP (ref 6–8.3)
RBC # BLD: 3.72 M/UL — LOW (ref 3.8–5.2)
RBC # FLD: 14.5 % — SIGNIFICANT CHANGE UP (ref 10.3–14.5)
SODIUM SERPL-SCNC: 143 MMOL/L — SIGNIFICANT CHANGE UP (ref 135–145)
VIT B12 SERPL-MCNC: 366 PG/ML — SIGNIFICANT CHANGE UP (ref 232–1245)
WBC # BLD: 6.07 K/UL — SIGNIFICANT CHANGE UP (ref 3.8–10.5)
WBC # FLD AUTO: 6.07 K/UL — SIGNIFICANT CHANGE UP (ref 3.8–10.5)

## 2022-11-12 PROCEDURE — 95720 EEG PHY/QHP EA INCR W/VEEG: CPT

## 2022-11-12 PROCEDURE — 99233 SBSQ HOSP IP/OBS HIGH 50: CPT | Mod: GC

## 2022-11-12 RX ORDER — ATORVASTATIN CALCIUM 80 MG/1
40 TABLET, FILM COATED ORAL AT BEDTIME
Refills: 0 | Status: DISCONTINUED | OUTPATIENT
Start: 2022-11-12 | End: 2022-11-17

## 2022-11-12 RX ORDER — POTASSIUM CHLORIDE 20 MEQ
10 PACKET (EA) ORAL ONCE
Refills: 0 | Status: COMPLETED | OUTPATIENT
Start: 2022-11-12 | End: 2022-11-12

## 2022-11-12 RX ORDER — ASPIRIN/CALCIUM CARB/MAGNESIUM 324 MG
81 TABLET ORAL DAILY
Refills: 0 | Status: DISCONTINUED | OUTPATIENT
Start: 2022-11-12 | End: 2022-11-17

## 2022-11-12 RX ADMIN — Medication 5 MILLIGRAM(S): at 06:07

## 2022-11-12 RX ADMIN — Medication 81 MILLIGRAM(S): at 21:39

## 2022-11-12 RX ADMIN — Medication 5 MILLIGRAM(S): at 19:24

## 2022-11-12 RX ADMIN — Medication 10 MILLIGRAM(S): at 17:49

## 2022-11-12 RX ADMIN — Medication 5 MILLIGRAM(S): at 00:13

## 2022-11-12 RX ADMIN — Medication 10 MILLIGRAM(S): at 10:24

## 2022-11-12 RX ADMIN — Medication 100 MILLIEQUIVALENT(S): at 10:23

## 2022-11-12 RX ADMIN — Medication 10 MILLIGRAM(S): at 03:01

## 2022-11-12 RX ADMIN — Medication 0.62 MILLIGRAM(S): at 21:35

## 2022-11-12 RX ADMIN — ATORVASTATIN CALCIUM 40 MILLIGRAM(S): 80 TABLET, FILM COATED ORAL at 21:39

## 2022-11-12 RX ADMIN — ENOXAPARIN SODIUM 30 MILLIGRAM(S): 100 INJECTION SUBCUTANEOUS at 21:35

## 2022-11-12 RX ADMIN — Medication 0.62 MILLIGRAM(S): at 15:13

## 2022-11-12 RX ADMIN — Medication 5 MILLIGRAM(S): at 14:13

## 2022-11-12 RX ADMIN — REMDESIVIR 500 MILLIGRAM(S): 5 INJECTION INTRAVENOUS at 18:07

## 2022-11-12 RX ADMIN — Medication 10 MILLIGRAM(S): at 21:36

## 2022-11-12 NOTE — PHYSICAL THERAPY INITIAL EVALUATION ADULT - ADDITIONAL COMMENTS
Social history obtained from [facility]: Patient lives with her son, able to ambulate independently without assistance. Patient can bathe independently but needs assistance with cooking.

## 2022-11-12 NOTE — CONSULT NOTE ADULT - SUBJECTIVE AND OBJECTIVE BOX
Patient is a 87y old  Female who presents with a chief complaint of       HPI:  The following history was obtained from the patient's son and ex-daughter in law as the patient was unable to provide history due to her altered mental status.  86 y/o woman with PMH of Alzheimer's dementia, HLD, HTN presenting with 2 days of confusion, agitation and combativeness. At baseline the patient knows her name and recognizes her family but does not know the date or her home address. She was dropped off by her son 2 days ago to her ex-daughter in law's place as he was travelling. On  the patient was well, she had dinner at her daughter in law's place and went to bed but was unable to sleep. She remained awake, pacing and talking to herself. She remained awake on the night of  and did not sleep the next day. According to the daughter in law she was confused on the 11/10 and was attempting to leave the house and would become combative when restrained. At the time she did not complain of any SOB, CP, cough, palpitations, abdominal pain, N/V, diarrhea, constipation, hematochezia, hematuria. hematemesis. However, the daughter noted that had become more wobbly than usual and would need to use the wall to support herself when walking, she did not have any urinary incontinence and was going to the bathroom whenever she needed to urinate, without any frequency, urgency or dysuria. The patient did not sustain any trauma or falls during prior or during this acute change in mental status.   She has not been vaccinated against covid.     VS: T 98, 51-58 Hr, 204-148/63-90, 99% on RA  Labs: CBC and CMP wnl, U/A negative, COVID +  Imaging:   CXR: Cardiomegaly, thoracic aortic calcification. Lungs and mediastinum are unremarkable. Right shoulder degenerative changes.  CT head:   No acute intracranial hemorrhage, mass effect, or CT evidence of recent transcortical infarction.  Interventions: Hydral 10 mg, Olanzapine 5 mg x3, Haloperidol 5 mg x1, versed 2mg x1   (2022 10:38)      PAST MEDICAL & SURGICAL HISTORY:      MEDICATIONS  (STANDING):  dextrose 5% + sodium chloride 0.45%. 1000 milliLiter(s) (70 mL/Hr) IV Continuous <Continuous>  dextrose 5%. 1000 milliLiter(s) (50 mL/Hr) IV Continuous <Continuous>  dextrose 5%. 1000 milliLiter(s) (100 mL/Hr) IV Continuous <Continuous>  dextrose 50% Injectable 25 Gram(s) IV Push once  dextrose 50% Injectable 12.5 Gram(s) IV Push once  dextrose 50% Injectable 25 Gram(s) IV Push once  enalaprilat Injectable 0.625 milliGRAM(s) IV Push every 6 hours  enoxaparin Injectable 30 milliGRAM(s) SubCutaneous every 24 hours  glucagon  Injectable 1 milliGRAM(s) IntraMuscular once  hydrALAZINE Injectable 10 milliGRAM(s) IV Push every 6 hours  metoprolol tartrate Injectable 5 milliGRAM(s) IV Push every 6 hours  remdesivir  IVPB   IV Intermittent   remdesivir  IVPB 100 milliGRAM(s) IV Intermittent every 24 hours    MEDICATIONS  (PRN):  dextrose Oral Gel 15 Gram(s) Oral once PRN Blood Glucose LESS THAN 70 milliGRAM(s)/deciliter  OLANZapine Injectable 2.5 milliGRAM(s) IntraMuscular every 6 hours PRN agitation          FAMILY HISTORY:    CBC Full  -  ( 2022 06:52 )  WBC Count : 6.07 K/uL  RBC Count : 3.72 M/uL  Hemoglobin : 11.6 g/dL  Hematocrit : 35.3 %  Platelet Count - Automated : 248 K/uL  Mean Cell Volume : 94.9 fl  Mean Cell Hemoglobin : 31.2 pg  Mean Cell Hemoglobin Concentration : 32.9 gm/dL  Auto Neutrophil # : 4.86 K/uL  Auto Lymphocyte # : 0.83 K/uL  Auto Monocyte # : 0.34 K/uL  Auto Eosinophil # : 0.01 K/uL  Auto Basophil # : 0.01 K/uL  Auto Neutrophil % : 80.0 %  Auto Lymphocyte % : 13.7 %  Auto Monocyte % : 5.6 %  Auto Eosinophil % : 0.2 %  Auto Basophil % : 0.2 %          143  |  109<H>  |  7   ----------------------------<  112<H>  3.8   |  21<L>  |  0.76    Ca    9.4      2022 06:52  Phos  3.8     11-12  Mg     1.9     11-12    TPro  6.8  /  Alb  3.9  /  TBili  0.7  /  DBili  x   /  AST  18  /  ALT  10  /  AlkPhos  70  11-12      Urinalysis Basic - ( 2022 00:41 )    Color: Yellow / Appearance: Clear / S.010 / pH: x  Gluc: x / Ketone: NEGATIVE  / Bili: NEGATIVE / Urobili: 0.2 E.U./dL   Blood: x / Protein: NEGATIVE mg/dL / Nitrite: NEGATIVE   Leuk Esterase: NEGATIVE / RBC: < 5 /HPF / WBC < 5 /HPF   Sq Epi: x / Non Sq Epi: x / Bacteria: x          Radiology :     < from: Xray Chest 1 View AP/PA (22 @ 01:07) >  ACC: [id number] EXAM:  XR CHEST AP OR PA 1V                          PROCEDURE DATE:  2022          INTERPRETATION:  Clinical history/reason for exam: AMS.    Frontal chest.    No comparison.    Findings/  impression: Cardiomegaly, thoracic aortic calcification. Lungs and   mediastinum are unremarkable. Right shoulder degenerative changes.        < from: CT Brain Stroke Protocol (22 @ 14:39) >  ACC: [id number] EXAM:  CT BRAIN STROKE PROTOCOL                          PROCEDURE DATE:  2022          INTERPRETATION:  CLINICAL INDICATION: Altered mental status. Code stroke.    TECHNIQUE: CT of the head was performed without the administration of   intravenous contrast.    COMPARISON: CT head 2022.    FINDINGS:    There is no evidence of acute infarction, intracranial hemorrhage or mass   lesion.    There is hypoattenuation of the subcortical and periventricular white   matter, which is nonspecific finding, but most likely represents sequela   of moderately severe chronic microvascular ischemic disease. There are   atherosclerotic calcifications of the cavernous and supraclinoid internal   carotid arteries bilaterally, and also the left intradural vertebral   artery. There is prominence of the cortical sulci related to underlying   brain parenchymal volume loss.    There is no evidence of hydrocephalus. There are no extra-axial fluid   collections.    The patient is statuspost bilateral lens replacement. The imaged   portions of the paranasal sinuses are aerated. The mastoid air cells are   clear. The visualized soft tissues and osseous structures appear normal.      IMPRESSION:    No acute intracranial findings.        < from: CT Angio Neck Stroke Protocol w/ IV Cont (22 @ 14:54) >    ACC: [id number] EXAM:  CT ANGIO NECK STROKE PROTCL IC                        ACC: [id number] EXAM:  CT ANGIO BRAIN STROKE PROTC IC                          PROCEDURE DATE:  2022          INTERPRETATION:  PROCEDURES:  CTA brain with intravenous contrast.  CTA neck with intravenous contrast.    INDICATION: Altered mental status    TECHNIQUE: Multiple axial thin section were obtained from the aortic arch   through the neck and intracranial compartment up to the calvarial vertex.   Imaging is done after the IV bolus of 75 mL Isovue 370. MIP series are   provided.    COMPARISON: CT head of the same day    FINDINGS:    INTRACRANIAL:  There is relatively calcified plaque of the supraclinoid and cavernous   bilateral internal carotid arteries without hemodynamically significant   stenosis. There is contrast filling the proximal anterior middle cerebral   arteries. There is focal mild stenosis of the right M1 segment (series 8   image 32). Otherwise no significant stenosis.    There is lack ofcontrast filling of the left V4 segment. There is   contrast filling of the right V4 segment without high-grade stenosis.   There is multifocal areas of irregularity and stenosis of the basilar   artery with severe stenosis of the proximal basilar artery at the level   the vertebrobasilar junction. There is multifocal moderate stenosis of   the mid basilar artery. There is contrast filling of the bilateral   posterior cerebral arteries with multifocal areas of mild to moderate   stenosis of the right PCA.    EXTRACRANIAL:    There is calcified plaque of the aortic arch and the origins of great   vessels without high-grade stenosis.    Both common carotid arteries are patent to the bifurcations.    There is calcified plaque of the bilateral carotid bifurcations and   proximal internal carotid arteries without hemodynamically significant   stenosis per NASCET criteria. There is noncalcified plaque of the left   vertebral artery origin resulting in moderate to severe stenosis. There   is focal moderate to severe stenosis of the proximal left vertebral   artery (series 9 image 20). There is diminutive caliber of the left V2   segment with multifocal areas of stenosis. There is lack of contrast   filling of the left vertebral artery at thedistal left V2 and V3   segments compatible with occlusion. There is contrast filling the   dominant right vertebral artery. There is calcified plaque at the origin   resulting in mild to moderate stenosis.    There is degenerative loss of height of the C3-C6 vertebral bodies.    IMPRESSION:    Intracranial CTA: Intracranial left vertebral artery occlusion.   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    Extracranial CTA: Occlusion of the distal cervical left vertebral artery   with multifocal areas of stenosis extending from the vertebral artery   origin            Vital Signs Last 24 Hrs  T(C): 36.8 (2022 09:21), Max: 37 (2022 20:16)  T(F): 98.2 (2022 09:21), Max: 98.6 (2022 20:16)  HR: 58 (2022 12:00) (47 - 90)  BP: 148/65 (2022 12:00) (130/69 - 199/78)  BP(mean): 93 (2022 12:00) (85 - 125)  RR: 19 (2022 12:00) (13 - 24)  SpO2: 100% (2022 12:00) (98% - 100%)    Parameters below as of 2022 12:00  Patient On (Oxygen Delivery Method): room air            REVIEW OF SYSTEMS:  per hpi         Physical Exam:  on COVID isolation , in accordance with current standards limiting patient contact , please refer to exam performed on 2022    Constitutional: WDWN resting comfortably in bed; NAD  Head: NC/AT  Eyes: PERRL, EOMI, anicteric sclera  ENT: no nasal discharge; uvula midline, no oropharyngeal erythema or exudates; MMM  Neck: supple; no JVD or thyromegaly  Respiratory: CTA B/L; no W/R/R, no retractions  Cardiac: +S1/S2; RRR; no M/R/G; PMI non-displaced  Gastrointestinal: soft, NT/ND; no rebound or guarding; +BSx4  Genitourinary: normal external genitalia  Back: spine midline, no bony tenderness or step-offs; no CVAT B/L  Extremities: WWP, no clubbing or cyanosis; no peripheral edema  Musculoskeletal: NROM x4; no joint swelling, tenderness or erythema  Vascular: 2+ radial, femoral, DP/PT pulses B/L  Dermatologic: skin warm, dry and intact; no rashes, wounds, or scars  Lymphatic: no submandibular or cervical LAD  Neurologic: AAOx1; CNII-XII grossly intact; no focal deficits        PM&R Impression :     1) deconditioned    2) no focal weakness      Recommendations / Plan :     1) Physical / Occupational therapy focusing on therapeutic exercises , equipment evaluation , bed mobility/transfer out of bed evaluation , progressive ambulation with assistive devices prn .    2) Disposition Plan / Recs  :   pending functional progress

## 2022-11-12 NOTE — PROGRESS NOTE ADULT - ATTENDING COMMENTS
Continue Remdesivir for possible COVID encephalopathy however family provided more updated info today that patient had a recent change in home environment. This may have precipitated psychosis. Behavioral health to see.

## 2022-11-12 NOTE — PROGRESS NOTE ADULT - PROBLEM SELECTOR PLAN 7
F: D5 and 1/2 NS at 70cc/hr  E: Replete PRN   Diet: NPO  DVT: lovenox 30 mg qdaily   Dispo plan: [facility] -> Tele F: D51/2 NS @ 70cc/hr  E: Replenish PRN   Diet: NPO  DVT: lovenox 30 mg qd  Dispo plan: 7L

## 2022-11-12 NOTE — PROGRESS NOTE ADULT - PROBLEM SELECTOR PLAN 5
Home meds: On Rosuvastatin   - Holding for now, can c/w Atorvastatin 40 mg daily when pt taking PO meds  - Start baby aspirin when able Home meds: On Rosuvastatin   - Holding for now, can c/w Atorvastatin 40 mg daily when pt taking PO meds  - Start ASA 81 when able

## 2022-11-12 NOTE — PHYSICAL THERAPY INITIAL EVALUATION ADULT - GENERAL OBSERVATIONS, REHAB EVAL
Spoke with ELAINA Regan who cleared for OOB. Patient received semi-supine in NAD with +ECG +IV +EEG.

## 2022-11-12 NOTE — PROGRESS NOTE ADULT - SUBJECTIVE AND OBJECTIVE BOX
OVERNIGHT EVENTS: Remdesivir started. SBP to 180 but resolved to 140 after administering standing hydral.    SUBJECTIVE / INTERVAL HPI: Patient seen and examined at bedside. Patient somnolent, AAOx0 withdraws to painful stimulus. ROS unobtainable.    Per conversation with patient's daughter, patient has normally been living with the patient's son-in-law. Patient has instead been staying with the daughter for the past couple of days. Patient was noted to not sleep for a period of 48 hours with increasing confusion, aggression and violence, for which the daughter called the ED. She states that at baseline the patient can relate stories from 50 years ago in great detail but has limited short term memory and does not recognize caregivers or family members.    VITAL SIGNS:  Vital Signs Last 24 Hrs  T(C): 36.8 (2022 09:21), Max: 37 (2022 20:16)  T(F): 98.2 (2022 09:21), Max: 98.6 (2022 20:16)  HR: 82 (2022 04:00) (47 - 90)  BP: 143/68 (2022 04:00) (130/69 - 190/64)  BP(mean): 98 (2022 04:00) (85 - 125)  RR: 14 (2022 04:00) (13 - 20)  SpO2: 98% (2022 04:00) (98% - 100%)    Parameters below as of 2022 04:00  Patient On (Oxygen Delivery Method): room air      PHYSICAL EXAM:  Constitutional: Thin and frail, does not participate in exam or interview  Head: NC/AT  Neck: supple; no JVD or thyromegaly  Respiratory: CTA B/L; no W/R/R, no retractions  Cardiac: +S1/S2; RRR; no M/R/G  Gastrointestinal: soft, NT/ND; no rebound or guarding; +BSx4  Extremities: WWP, no clubbing or cyanosis; no peripheral edema  Vascular: 2+ radial, DP/PT pulses B/L  Dermatologic: skin warm, dry and intact; no rashes, wounds, or scars  Neurologic: AAOx0; no focal deficits    MEDICATIONS:  MEDICATIONS  (STANDING):  dextrose 5% + sodium chloride 0.45%. 1000 milliLiter(s) (70 mL/Hr) IV Continuous <Continuous>  dextrose 5%. 1000 milliLiter(s) (50 mL/Hr) IV Continuous <Continuous>  dextrose 5%. 1000 milliLiter(s) (100 mL/Hr) IV Continuous <Continuous>  dextrose 50% Injectable 25 Gram(s) IV Push once  dextrose 50% Injectable 12.5 Gram(s) IV Push once  dextrose 50% Injectable 25 Gram(s) IV Push once  enoxaparin Injectable 30 milliGRAM(s) SubCutaneous every 24 hours  glucagon  Injectable 1 milliGRAM(s) IntraMuscular once  hydrALAZINE Injectable 10 milliGRAM(s) IV Push every 6 hours  metoprolol tartrate Injectable 5 milliGRAM(s) IV Push every 6 hours  potassium chloride  10 mEq/100 mL IVPB 10 milliEquivalent(s) IV Intermittent once  remdesivir  IVPB   IV Intermittent   remdesivir  IVPB 100 milliGRAM(s) IV Intermittent every 24 hours    MEDICATIONS  (PRN):  dextrose Oral Gel 15 Gram(s) Oral once PRN Blood Glucose LESS THAN 70 milliGRAM(s)/deciliter  OLANZapine Injectable 2.5 milliGRAM(s) IntraMuscular every 6 hours PRN agitation      ALLERGIES:  Allergies    No Known Allergies    Intolerances        LABS:                        11.6   6.07  )-----------( 248      ( 2022 06:52 )             35.3         143  |  109<H>  |  7   ----------------------------<  112<H>  3.8   |  21<L>  |  0.76    Ca    9.4      2022 06:52  Phos  3.8       Mg     1.9         TPro  6.8  /  Alb  3.9  /  TBili  0.7  /  DBili  x   /  AST  18  /  ALT  10  /  AlkPhos  70  -12    PT/INR - ( 2022 14:13 )   PT: 13.8 sec;   INR: 1.16          PTT - ( 2022 14:13 )  PTT:27.7 sec  Urinalysis Basic - ( 2022 00:41 )    Color: Yellow / Appearance: Clear / S.010 / pH: x  Gluc: x / Ketone: NEGATIVE  / Bili: NEGATIVE / Urobili: 0.2 E.U./dL   Blood: x / Protein: NEGATIVE mg/dL / Nitrite: NEGATIVE   Leuk Esterase: NEGATIVE / RBC: < 5 /HPF / WBC < 5 /HPF   Sq Epi: x / Non Sq Epi: x / Bacteria: x      CAPILLARY BLOOD GLUCOSE      POCT Blood Glucose.: 128 mg/dL (2022 04:36)      RADIOLOGY & ADDITIONAL TESTS: Reviewed.

## 2022-11-12 NOTE — EEG REPORT - NS EEG TEXT BOX
[facility] Department of Neurology  Inpatient Continuous video-Electroencephalogram    Patient Name:	ZANDRA HARMAN    :	1935  MRN:	[id number]    Study Start Date/Time:  2022, 5:42:10 PM  Study End Date/Time:    Referred by: Rea Ba MD    Brief Clinical History:  ZANDRA HARMAN is a 87 year old Female; study performed to investigate for seizures or markers of epilepsy.    Diagnosis Code:   R56.9 convulsions/seizure  The live video was: unmonitored.    Pertinent Medications:  n/a    Acquisition Details:  Electroencephalography was acquired using a minimum of 21 channels on an [employer] system v 8.5.1 with electrode placement according to the standard International 10-20 system following ACNS (American Clinical Neurophysiology Society) guidelines for Long-Term Video EEG monitoring.  Anterior temporal T1 and T2 electrodes were utilized whenever possible.   The [employer] automated spike & seizure detections were all reviewed in detail, in addition to extensive portions of raw EEG.      Day 1: 2022 @ 5:42:10 PM to next morning @ 07:00 am  Background:  continuous, with predominantly alpha/theta frequencies.  Symmetry:  No persistent asymmetries of voltage or frequency.  Posterior Dominant Rhythm: 7- 8 Hz fragmented.  Organization: Appropriate anterior-posterior gradient.  Voltage:  Normal (20+ uV)  Variability: Yes. 		Reactivity: Yes.  N2 sleep: Symmetric, synchronous spindles and K complexes.  Spontaneous Activity:  No epileptiform discharges.  Periodic/rhythmic activity:  None.  Events:  No electrographic seizures or significant clinical events.  Provocations:  Hyperventilation and Photic stimulation: was not performed.    Daily Summary:    Mild generalized   slowing suggestive of a Mild degree of diffuse or multifocal  dysfunction.  No epileptiform activity and no significant clinical events occurred.      Yina Glass MD  Attending Neurologist, [facility] Epilepsy Program

## 2022-11-12 NOTE — PHYSICAL THERAPY INITIAL EVALUATION ADULT - ORIENTATION, REHAB EVAL
knows first name, no last; knows month of birthday, not day or year/not oriented to person, place, time or situation

## 2022-11-12 NOTE — PROGRESS NOTE ADULT - PROBLEM SELECTOR PLAN 4
Home meds: Metoprolol, Nifedipine, losartan    - Started on IV meds given AMS and NPO status     - IV hydral 10mg q6hrs     - IV lopressor 5mg q6hrs     - If BPs are uncontrolled, can give enalaprilate 0.625mg q6hrs (can increase to 1.25mg q6hrs if needed). Home meds: Metoprolol, Nifedipine, losartan  - Started on IV meds given AMS and NPO status     - IV hydral 10mg q6hrs     - IV lopressor 5mg q6hrs     - If BPs are uncontrolled, can give enalaprilate 0.625mg q6hrs (can increase to 1.25mg q6hrs if needed). Home meds: Metoprolol, Nifedipine, losartan  - Started on IV meds given AMS and NPO status     - IV hydral 10mg q6hrs     - IV lopressor 5mg q6hrs     - IV enaliprat 0.625mg q6h

## 2022-11-12 NOTE — PHYSICAL THERAPY INITIAL EVALUATION ADULT - PERTINENT HX OF CURRENT PROBLEM, REHAB EVAL
Patient is an 87 year old female presenting with 2 days of confusion, agitation likely in the setting of acute delirium from toxic metabolic encephalopathy in the setting covid infection

## 2022-11-12 NOTE — CONSULT NOTE ADULT - ASSESSMENT
per Internal Medicine    87 y o woman with PMH of Alzheimer's dementia. HLD, HTN presenting with 2 days of confusion, agitation likely in the setting of acute delirium from toxic metabolic encephalopathy in the setting covid infection      Problem/Plan - 1:  ·  Problem: Toxic metabolic encephalopathy.   ·  Plan: The patient is baseline A&O x1. However she had acute worsening over the past 2 days   Her AMS is likely due to toxic metabolic encephalopathy given the absence of any structural causes on CT head   She has no history of drug use and minimal alcohol use history, making withdrawal less likely, she has had no trauma and CT did not show any hemorrhage, her CMP did not show any acute metabolic derangements, physical exam labs and VS not concerning for any CNS infections at this time. She not hypoxic at this time and is not requiring any oxygen. She will need assessment for vitamin deficiencies considering that she looks cachetic and may be undernourished, and drug use to rule out any intoxications.   The patient is COVID+ and her acute exacerbation may be a consequence of her infection     - F/u B12 levels   - F/u TSH levels   - F/u Utox   - F/u HIV  - F/u syphilus screen  - Melatonin for sleep 5mg   - Zyprexa 2.5 mg PRN for agitation (PO if amenable otherwise IM).    Problem/Plan - 2:  ·  Problem: 2019 novel coronavirus disease (COVID-19).   ·  Plan: the patient is currently asymptomatic and has never been vaccinated against covid. She will need treatment as she is a high risk patient    - F/u Feritin, CRP and LDH  - Consider starting remdesivir for 3 days.    Problem/Plan - 3:  ·  Problem: HTN (hypertension).   ·  Plan: Resume home meds: Metoprolol, Nifedipine, losartan   - C/w Metoprolol 25mg daily,   - c/w Nifedipine 30mg daily   - C/w Losartan 50 mg daily.    Problem/Plan - 4:  ·  Problem: HLD (hyperlipidemia).   ·  Plan: Resume home meds: On Rosuvastatin   - Interchanged to Atorvastatin 40 mg daily.    Problem/Plan - 5:  ·  Problem: Alzheimer's dementia.   ·  Plan: Resume home meds: Donepizil   - C/w donepizil 10 mg daily at bedtime.    Problem/Plan - 6:  ·  Problem: Prophylactic measure.   ·  Plan: F: 1L NS   E: Replete PRN   Diet: Dash diet   DVT: lovenox 40 mg qdaily  Dispo plan: [facility].

## 2022-11-12 NOTE — PROGRESS NOTE ADULT - PROBLEM SELECTOR PLAN 2
the patient is currently asymptomatic and has never been vaccinated against covid. She will need treatment as she is a high risk patient.    - F/u Ferritin, CRP and LDH  - Started on remdesivir Asymptomatic, not COVID vaccinated. She will need treatment as she is a high risk patient.  - F/u Ferritin, CRP and LDH  - Remdesivir Asymptomatic, not COVID vaccinated. She will need treatment as she is a high risk patient.   (H), ferritin and CRP wnl  - Remdesivir

## 2022-11-12 NOTE — PROGRESS NOTE ADULT - PROBLEM SELECTOR PLAN 3
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 Per stroke team, likely chronic in nature.    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 Per stroke team, likely chronic in nature.  - Start high intensity statin and ASA 81 when tolerating PO meds

## 2022-11-12 NOTE — PROGRESS NOTE ADULT - ASSESSMENT
86 y/o woman with PMH of Alzheimer's dementia. HLD, HTN presenting with 2 days of confusion, agitation likely in the setting of acute delirium from toxic metabolic encephalopathy in the setting covid infection       86 y/o woman with PMH of Alzheimer's dementia. HLD, HTN presenting with 2 days of confusion, agitation likely in the setting of acute delirium from toxic metabolic encephalopathy i/s/o covid infection

## 2022-11-12 NOTE — PROGRESS NOTE ADULT - PROBLEM SELECTOR PLAN 6
Home meds: Donepizil  - Holding, can c/w donepizil 10 mg daily at bedtime once pt taking PO meds Home meds: Donepezil  - Holding, can c/w donepezil 10 mg qhs when tolerating po

## 2022-11-12 NOTE — PROGRESS NOTE ADULT - PROBLEM SELECTOR PLAN 1
The patient is baseline A&O x1. However she had acute worsening over the past 2 days. Workup for AMS so far negative for structural causes/ICH, no history of drug use, minimal alcohol use, no trauma history. CMP unremarkable for significant electrolyte abnormalities. No signs of CNS infection. The patient is COVID+ and unvaccinated, likely contributing to AMS. Code stroke called for decreased responsiveness, negative for acute stroke. Admitting AMS etiology unclear, possibly 2/2 worsening dementia vs COVID. Acute decreased responsiveness likely 2/2 polypharmacy on top of other causes    - F/u B12 levels, TSH, Utox, HIV  - f/u vEEG  - Melatonin for sleep 5mg   - For agitation, Zyprexa 2.5 mg IM PRN first line  - Avoid giving haldol for now given unclear history of LBD The patient is baseline A&O x1. However she had acute worsening over the past 2 days. Possibly i/s/o change in environment staying with the daughter instead of son-in-law, given patient's history of progressive dementia and Alzheimer's disease. W/u for AMS negative for structural causes/ICH, no history of drug use, minimal alcohol use, no trauma history. CMP unrevealing. No signs of CNS infection.   COVID+ and unvaccinated, likely contributing to AMS.   Code stroke called for decreased responsiveness, negative for acute stroke. Admitting AMS etiology unclear, possibly 2/2 worsening dementia vs COVID. Acute decreased responsiveness likely 2/2 polypharmacy on top of other causes.  TSH 4.3-->3.4 (wnl), B12 wnl, HIV neg  - F/u Utox  - f/u vEEG  - Melatonin 5mg for sleep  - Zyprexa 2.5 mg IM prn for agitation  - Avoid giving haldol for now given unclear history of LBD The patient is baseline A&O x1, acute worsening over the past 2 days. Possibly i/s/o change in environment staying with the daughter instead of son-in-law, given patient's history of progressive dementia and Alzheimer's disease. W/u for AMS negative for structural causes/ICH, no history of drug use, minimal alcohol use, no trauma history. CMP unrevealing. No signs of CNS infection.   COVID+ and unvaccinated, likely contributing to AMS.   Code stroke called for decreased responsiveness, negative for acute stroke. Admitting AMS etiology unclear, possibly 2/2 worsening dementia vs COVID. Acute decreased responsiveness likely 2/2 polypharmacy on top of other causes.  TSH 4.3-->3.4 (wnl), B12 wnl, HIV neg  - F/u Utox  - f/u vEEG  - Melatonin 5mg for sleep  - Zyprexa 2.5 mg IM prn for agitation  - Avoid giving haldol for now given unclear history of LBD Baseline A&O x1, acute worsening in past 2 days. Possibly i/s/o change in environment staying with daughter instead of son-in-law, given history of progressive dementia and Alzheimer's disease. W/u for AMS negative for structural causes/ICH, no history of drug use, minimal EtOH use, no trauma history. CMP unrevealing. No sign of CNS infection.   COVID+ and unvaccinated, likely contributing to AMS.   Code stroke called for decreased responsiveness, negative for acute stroke. Admitting AMS etiology unclear, possibly 2/2 worsening dementia vs COVID. Acute decreased responsiveness likely 2/2 polypharmacy on top of other causes.  TSH 4.3-->3.4 (wnl), B12 wnl, HIV neg  - F/u Utox  - f/u vEEG  - Melatonin 5mg for sleep  - Zyprexa 2.5 mg IM prn for agitation  - Avoid giving haldol for now given unclear history of LBD Baseline A&O x1, acute worsening in past 2 days. Possibly i/s/o change in environment staying with daughter instead of son-in-law, given history of progressive dementia and Alzheimer's disease. W/u for AMS negative for structural causes/ICH, no history of drug use, minimal EtOH use, no trauma history. CMP unrevealing. No sign of CNS infection.   COVID+ and unvaccinated, likely contributing to AMS.   Code stroke called for decreased responsiveness, negative for acute stroke. Admitting AMS etiology unclear, possibly 2/2 worsening dementia vs COVID. Acute decreased responsiveness likely 2/2 polypharmacy on top of other causes.  TSH 4.3-->3.4 (wnl), B12 wnl, HIV neg  - f/u MRI brain non-con- r/o PRES  - F/u Utox  - f/u vEEG  - Melatonin 5mg for sleep  - Zyprexa 2.5 mg IM prn for agitation  - Avoid giving haldol for now given unclear history of LBD

## 2022-11-13 LAB
ALBUMIN SERPL ELPH-MCNC: 4.1 G/DL — SIGNIFICANT CHANGE UP (ref 3.3–5)
ALP SERPL-CCNC: 63 U/L — SIGNIFICANT CHANGE UP (ref 40–120)
ALT FLD-CCNC: 9 U/L — LOW (ref 10–45)
ANION GAP SERPL CALC-SCNC: 13 MMOL/L — SIGNIFICANT CHANGE UP (ref 5–17)
AST SERPL-CCNC: 20 U/L — SIGNIFICANT CHANGE UP (ref 10–40)
BASOPHILS # BLD AUTO: 0.01 K/UL — SIGNIFICANT CHANGE UP (ref 0–0.2)
BASOPHILS NFR BLD AUTO: 0.3 % — SIGNIFICANT CHANGE UP (ref 0–2)
BILIRUB SERPL-MCNC: 1.2 MG/DL — SIGNIFICANT CHANGE UP (ref 0.2–1.2)
BUN SERPL-MCNC: 12 MG/DL — SIGNIFICANT CHANGE UP (ref 7–23)
CALCIUM SERPL-MCNC: 9.3 MG/DL — SIGNIFICANT CHANGE UP (ref 8.4–10.5)
CHLORIDE SERPL-SCNC: 109 MMOL/L — HIGH (ref 96–108)
CO2 SERPL-SCNC: 21 MMOL/L — LOW (ref 22–31)
CREAT SERPL-MCNC: 0.8 MG/DL — SIGNIFICANT CHANGE UP (ref 0.5–1.3)
EGFR: 72 ML/MIN/1.73M2 — SIGNIFICANT CHANGE UP
EOSINOPHIL # BLD AUTO: 0.01 K/UL — SIGNIFICANT CHANGE UP (ref 0–0.5)
EOSINOPHIL NFR BLD AUTO: 0.3 % — SIGNIFICANT CHANGE UP (ref 0–6)
GLUCOSE BLDC GLUCOMTR-MCNC: 103 MG/DL — HIGH (ref 70–99)
GLUCOSE SERPL-MCNC: 88 MG/DL — SIGNIFICANT CHANGE UP (ref 70–99)
HCT VFR BLD CALC: 33.9 % — LOW (ref 34.5–45)
HGB BLD-MCNC: 11.2 G/DL — LOW (ref 11.5–15.5)
IMM GRANULOCYTES NFR BLD AUTO: 0.3 % — SIGNIFICANT CHANGE UP (ref 0–0.9)
LYMPHOCYTES # BLD AUTO: 0.78 K/UL — LOW (ref 1–3.3)
LYMPHOCYTES # BLD AUTO: 19.6 % — SIGNIFICANT CHANGE UP (ref 13–44)
MAGNESIUM SERPL-MCNC: 2 MG/DL — SIGNIFICANT CHANGE UP (ref 1.6–2.6)
MCHC RBC-ENTMCNC: 31.6 PG — SIGNIFICANT CHANGE UP (ref 27–34)
MCHC RBC-ENTMCNC: 33 GM/DL — SIGNIFICANT CHANGE UP (ref 32–36)
MCV RBC AUTO: 95.8 FL — SIGNIFICANT CHANGE UP (ref 80–100)
MONOCYTES # BLD AUTO: 0.29 K/UL — SIGNIFICANT CHANGE UP (ref 0–0.9)
MONOCYTES NFR BLD AUTO: 7.3 % — SIGNIFICANT CHANGE UP (ref 2–14)
NEUTROPHILS # BLD AUTO: 2.87 K/UL — SIGNIFICANT CHANGE UP (ref 1.8–7.4)
NEUTROPHILS NFR BLD AUTO: 72.2 % — SIGNIFICANT CHANGE UP (ref 43–77)
NRBC # BLD: 0 /100 WBCS — SIGNIFICANT CHANGE UP (ref 0–0)
PHOSPHATE SERPL-MCNC: 3.7 MG/DL — SIGNIFICANT CHANGE UP (ref 2.5–4.5)
PLATELET # BLD AUTO: 243 K/UL — SIGNIFICANT CHANGE UP (ref 150–400)
POTASSIUM SERPL-MCNC: 4.1 MMOL/L — SIGNIFICANT CHANGE UP (ref 3.5–5.3)
POTASSIUM SERPL-SCNC: 4.1 MMOL/L — SIGNIFICANT CHANGE UP (ref 3.5–5.3)
PROT SERPL-MCNC: 7 G/DL — SIGNIFICANT CHANGE UP (ref 6–8.3)
RBC # BLD: 3.54 M/UL — LOW (ref 3.8–5.2)
RBC # FLD: 14.6 % — HIGH (ref 10.3–14.5)
SODIUM SERPL-SCNC: 143 MMOL/L — SIGNIFICANT CHANGE UP (ref 135–145)
WBC # BLD: 3.97 K/UL — SIGNIFICANT CHANGE UP (ref 3.8–10.5)
WBC # FLD AUTO: 3.97 K/UL — SIGNIFICANT CHANGE UP (ref 3.8–10.5)

## 2022-11-13 PROCEDURE — 99233 SBSQ HOSP IP/OBS HIGH 50: CPT

## 2022-11-13 PROCEDURE — 95720 EEG PHY/QHP EA INCR W/VEEG: CPT

## 2022-11-13 PROCEDURE — 99233 SBSQ HOSP IP/OBS HIGH 50: CPT | Mod: GC

## 2022-11-13 RX ORDER — DONEPEZIL HYDROCHLORIDE 10 MG/1
10 TABLET, FILM COATED ORAL AT BEDTIME
Refills: 0 | Status: DISCONTINUED | OUTPATIENT
Start: 2022-11-13 | End: 2022-11-17

## 2022-11-13 RX ORDER — NIFEDIPINE 30 MG
60 TABLET, EXTENDED RELEASE 24 HR ORAL DAILY
Refills: 0 | Status: DISCONTINUED | OUTPATIENT
Start: 2022-11-14 | End: 2022-11-14

## 2022-11-13 RX ORDER — METOPROLOL TARTRATE 50 MG
25 TABLET ORAL DAILY
Refills: 0 | Status: DISCONTINUED | OUTPATIENT
Start: 2022-11-13 | End: 2022-11-13

## 2022-11-13 RX ORDER — METOPROLOL TARTRATE 50 MG
25 TABLET ORAL EVERY 24 HOURS
Refills: 0 | Status: DISCONTINUED | OUTPATIENT
Start: 2022-11-13 | End: 2022-11-17

## 2022-11-13 RX ORDER — HYDRALAZINE HCL 50 MG
5 TABLET ORAL ONCE
Refills: 0 | Status: COMPLETED | OUTPATIENT
Start: 2022-11-13 | End: 2022-11-13

## 2022-11-13 RX ORDER — NIFEDIPINE 30 MG
30 TABLET, EXTENDED RELEASE 24 HR ORAL EVERY 24 HOURS
Refills: 0 | Status: DISCONTINUED | OUTPATIENT
Start: 2022-11-13 | End: 2022-11-13

## 2022-11-13 RX ORDER — LOSARTAN POTASSIUM 100 MG/1
50 TABLET, FILM COATED ORAL EVERY 24 HOURS
Refills: 0 | Status: DISCONTINUED | OUTPATIENT
Start: 2022-11-13 | End: 2022-11-17

## 2022-11-13 RX ADMIN — Medication 25 MILLIGRAM(S): at 12:44

## 2022-11-13 RX ADMIN — OLANZAPINE 2.5 MILLIGRAM(S): 15 TABLET, FILM COATED ORAL at 14:47

## 2022-11-13 RX ADMIN — LOSARTAN POTASSIUM 50 MILLIGRAM(S): 100 TABLET, FILM COATED ORAL at 12:44

## 2022-11-13 RX ADMIN — Medication 5 MILLIGRAM(S): at 00:25

## 2022-11-13 RX ADMIN — Medication 5 MILLIGRAM(S): at 16:52

## 2022-11-13 RX ADMIN — Medication 81 MILLIGRAM(S): at 12:44

## 2022-11-13 RX ADMIN — ENOXAPARIN SODIUM 30 MILLIGRAM(S): 100 INJECTION SUBCUTANEOUS at 22:55

## 2022-11-13 RX ADMIN — Medication 5 MILLIGRAM(S): at 06:07

## 2022-11-13 RX ADMIN — ATORVASTATIN CALCIUM 40 MILLIGRAM(S): 80 TABLET, FILM COATED ORAL at 22:55

## 2022-11-13 RX ADMIN — OLANZAPINE 2.5 MILLIGRAM(S): 15 TABLET, FILM COATED ORAL at 02:24

## 2022-11-13 RX ADMIN — DONEPEZIL HYDROCHLORIDE 10 MILLIGRAM(S): 10 TABLET, FILM COATED ORAL at 22:57

## 2022-11-13 RX ADMIN — Medication 10 MILLIGRAM(S): at 05:28

## 2022-11-13 RX ADMIN — REMDESIVIR 500 MILLIGRAM(S): 5 INJECTION INTRAVENOUS at 18:12

## 2022-11-13 RX ADMIN — Medication 0.62 MILLIGRAM(S): at 02:23

## 2022-11-13 NOTE — PROGRESS NOTE ADULT - PROBLEM SELECTOR PLAN 3
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 Per stroke team, likely chronic in nature.    - c/w atorvastatin 40mg qhs and ASA 81mg qd

## 2022-11-13 NOTE — PROGRESS NOTE ADULT - ATTENDING COMMENTS
**My note supersedes the resident's note in the event of discrepancies**    Patient seen and examined at bedside. Transferred from [facility] to [facility]. Had RRT/Stroke code on 11/11, left vertebral artery occlusion likely chronic per neuro, got narcan/flumazenil with improvement in mental status. She has no complaints. Only oriented to person.     #AMS- likely toxic metabolic encephalopathy  -likely due to covid  -veeg negative  -tsh, b12 wnl  -rpr pending  -mr brain pending  -d/c zyprexa   -Recommend non-pharmacological interventions to prevent/treat delirium  - maintain day/night light cycles  - optimize sleep-wake cycle, minimize environmental noise  - reorientation frequently  - use verbal redirection as first line  - minimize restraints and lines  - ensure good bladder/bowel function.    #COVID19  -on RA  -Asymptomatic, not COVID vaccinated. She will need treatment as she is a high risk patient. Unclear if AMS was from COVID encephalopathy.   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 Per stroke team, likely chronic in nature.  - c/w atorvastatin 40mg qhs and ASA 81mg qd    #HTN Urgency  -BP in 200's on exam  -currently on losartan 50mg qd, toprol 25mg qd, nifedipine xl 30mg qd  -give 5 hydralazine now; recommend increasing nifedipine to 60mg     #Dementia  -c/w donepezil    #HLD  -c/w statin    #Progress Note Handoff  Pending (specify):  RDV treatment, improvement in BP  Disposition: Unknown at this time

## 2022-11-13 NOTE — PROGRESS NOTE ADULT - PROBLEM SELECTOR PLAN 4
Home meds: Metoprolol, Nifedipine, losartan  - Transitioned back to oral meds given improvement in mental status Home meds: Metoprolol, Nifedipine, losartan    - c/w home metoprolol 25mg qd, nifedipine 30mg qd, and losartan 50mg qd

## 2022-11-13 NOTE — PROGRESS NOTE ADULT - PROBLEM SELECTOR PLAN 6
Home meds: Donepezil  - Holding, can c/w donepezil 10 mg qhs when tolerating po Home meds: Donepezil  - c/w donepezil 10 mg qhs

## 2022-11-13 NOTE — PROGRESS NOTE ADULT - SUBJECTIVE AND OBJECTIVE BOX
*************************TRANSFER FROM [facility] TO [facility]*********************    HOSPITAL COURSE:  86 y/o woman with PMH of Alzheimer's dementia (baseline ambulatory AAOx0-1), HLD, HTN presenting with 2 days of confusion, agitation, and insomnia. Given patient's dementia, history was obtained from patient's family. Pt last noted to be at baseline on 11/9, was unable to sleep that night. Appeared confused on 11/10. Became combative when she was restrained from leaving the house. CT head negative, no history of drug use and minimal alcohol use history, no head trauma, no metabolic abnormalities. Rapid response called on 11/11 for unresponsiveness, stroke code called with negative workup. Stepped up to telemetry unit for closer monitoring, started on vEEG monitoring, no signs of epileptiform activity. Leading theory for altered mental status consistent with acute delirium and metabolic encephalopathy. Etiology likely multifactorial consisting of acute changes in environment, COVID infection, and progressing dementia. Pt's mental status improved over time, stable for stepdown to floors.     SUBJECTIVE:    VITAL SIGNS:  Vital Signs Last 24 Hrs  T(C): 36.9 (13 Nov 2022 13:45), Max: 36.9 (13 Nov 2022 10:34)  T(F): 98.5 (13 Nov 2022 13:45), Max: 98.5 (13 Nov 2022 10:34)  HR: 93 (13 Nov 2022 13:45) (56 - 93)  BP: 172/59 (13 Nov 2022 13:45) (127/70 - 198/83)  BP(mean): 128 (13 Nov 2022 12:25) (84 - 128)  RR: 18 (13 Nov 2022 13:45) (16 - 31)  SpO2: 100% (13 Nov 2022 13:45) (98% - 100%)    Parameters below as of 13 Nov 2022 13:45  Patient On (Oxygen Delivery Method): room air        PHYSICAL EXAM:  General: NAD; speaking in full sentences  HEENT: NC/AT; PERRL; EOMI; MMM  Neck: supple; no JVD  Cardiac: RRR; +S1/S2  Pulm: CTA B/L; no W/R/R  GI: soft, NT/ND, +BS  Extremities:  no edema, clubbing or cyanosis  Vasc: 2+ radial, DP pulses B/L  Neuro: AAOx3; no focal deficits    MEDICATIONS:  MEDICATIONS  (STANDING):  aspirin  chewable 81 milliGRAM(s) Oral daily  atorvastatin 40 milliGRAM(s) Oral at bedtime  dextrose 5% + sodium chloride 0.45%. 1000 milliLiter(s) (70 mL/Hr) IV Continuous <Continuous>  dextrose 5%. 1000 milliLiter(s) (50 mL/Hr) IV Continuous <Continuous>  dextrose 5%. 1000 milliLiter(s) (100 mL/Hr) IV Continuous <Continuous>  dextrose 50% Injectable 25 Gram(s) IV Push once  dextrose 50% Injectable 12.5 Gram(s) IV Push once  dextrose 50% Injectable 25 Gram(s) IV Push once  donepezil 10 milliGRAM(s) Oral at bedtime  enoxaparin Injectable 30 milliGRAM(s) SubCutaneous every 24 hours  glucagon  Injectable 1 milliGRAM(s) IntraMuscular once  losartan 50 milliGRAM(s) Oral every 24 hours  metoprolol succinate ER 25 milliGRAM(s) Oral every 24 hours  NIFEdipine XL 30 milliGRAM(s) Oral every 24 hours  remdesivir  IVPB   IV Intermittent   remdesivir  IVPB 100 milliGRAM(s) IV Intermittent every 24 hours    MEDICATIONS  (PRN):  dextrose Oral Gel 15 Gram(s) Oral once PRN Blood Glucose LESS THAN 70 milliGRAM(s)/deciliter  OLANZapine Injectable 2.5 milliGRAM(s) IntraMuscular every 6 hours PRN agitation      ALLERGIES:  Allergies    No Known Allergies    Intolerances        LABS:                        11.2   3.97  )-----------( 243      ( 13 Nov 2022 08:17 )             33.9     11-13    143  |  109<H>  |  12  ----------------------------<  88  4.1   |  21<L>  |  0.80    Ca    9.3      13 Nov 2022 08:17  Phos  3.7     11-13  Mg     2.0     11-13    TPro  7.0  /  Alb  4.1  /  TBili  1.2  /  DBili  x   /  AST  20  /  ALT  9<L>  /  AlkPhos  63  11-13        RADIOLOGY & ADDITIONAL TESTS: Reviewed. *************************TRANSFER FROM [facility] TO [facility]*********************    HOSPITAL COURSE:  86 y/o woman with PMH of Alzheimer's dementia (baseline AAOx0-1, ambulatory), HLD, HTN presenting with 2 days of confusion, agitation, and insomnia. Pt last noted to be at baseline on 11/9, when pt's son (caretaker) dropped pt off at pt's ex-daughter-in-law's place. Pt was unable to sleep that night and appeared confused on 11/10. Pt became combative when she was restrained from leaving the house. Pt admitted for acute delirium from toxic metabolic encephalopathy in the setting of covid infection. Pt was agitated at [facility] and was given olanzapine 15 mg, haldol 5 mg, benadryl 25 IV X1 and versed 2 mg. On arrival to [facility] rapid response called for unresponsiveness. Stroke code called for AMS, NIHSS 18, no focal deficits noted; pt given Narcan, Flumazenil and pt became more responsive. CTH negative, CTA H/N with diffuse ICAD, intracranial left vertebral artery occlusion, multifocal moderate to severe stenosis of basilar artery, occlusion of distal cervical left vertebral artery with multifocal areas of stenosis extending from origin of vert. Stepped up to telemetry unit for closer monitoring, vEEG demonstrated no signs of epileptiform activity. Leading theory for AMS consistent with acute delirium and metabolic encephalopathy. Etiology likely multifactorial consisting of acute changes in environment, COVID infection, and progressing dementia. Pt's mental status improved over time, stable for stepdown to floors.     SUBJECTIVE:  Pt seen and examined at bedside. Feels well currently and has no complaints. No fevers, night sweats, cough, CP, dyspnea, abd pain, dysuria.     VITAL SIGNS:  Vital Signs Last 24 Hrs  T(C): 36.9 (13 Nov 2022 13:45), Max: 36.9 (13 Nov 2022 10:34)  T(F): 98.5 (13 Nov 2022 13:45), Max: 98.5 (13 Nov 2022 10:34)  HR: 93 (13 Nov 2022 13:45) (56 - 93)  BP: 172/59 (13 Nov 2022 13:45) (127/70 - 198/83)  BP(mean): 128 (13 Nov 2022 12:25) (84 - 128)  RR: 18 (13 Nov 2022 13:45) (16 - 31)  SpO2: 100% (13 Nov 2022 13:45) (98% - 100%)    Parameters below as of 13 Nov 2022 13:45  Patient On (Oxygen Delivery Method): room air        PHYSICAL EXAM:  General: thin elderly female resting in bed in NAD; speaking in full sentences  HEENT: NC/AT; PERRL; EOMI; MMM  Neck: supple  Cardiac: RRR; +S1/S2  Pulm: CTA B/L; no W/R/R  GI: soft, NT/ND, +BS  Extremities:  no edema, clubbing or cyanosis  Vasc: 2+ radial, DP pulses B/L  Neuro: AAOx1 (self); no focal deficits    MEDICATIONS:  MEDICATIONS  (STANDING):  aspirin  chewable 81 milliGRAM(s) Oral daily  atorvastatin 40 milliGRAM(s) Oral at bedtime  dextrose 5% + sodium chloride 0.45%. 1000 milliLiter(s) (70 mL/Hr) IV Continuous <Continuous>  dextrose 5%. 1000 milliLiter(s) (50 mL/Hr) IV Continuous <Continuous>  dextrose 5%. 1000 milliLiter(s) (100 mL/Hr) IV Continuous <Continuous>  dextrose 50% Injectable 25 Gram(s) IV Push once  dextrose 50% Injectable 12.5 Gram(s) IV Push once  dextrose 50% Injectable 25 Gram(s) IV Push once  donepezil 10 milliGRAM(s) Oral at bedtime  enoxaparin Injectable 30 milliGRAM(s) SubCutaneous every 24 hours  glucagon  Injectable 1 milliGRAM(s) IntraMuscular once  losartan 50 milliGRAM(s) Oral every 24 hours  metoprolol succinate ER 25 milliGRAM(s) Oral every 24 hours  NIFEdipine XL 30 milliGRAM(s) Oral every 24 hours  remdesivir  IVPB   IV Intermittent   remdesivir  IVPB 100 milliGRAM(s) IV Intermittent every 24 hours    MEDICATIONS  (PRN):  dextrose Oral Gel 15 Gram(s) Oral once PRN Blood Glucose LESS THAN 70 milliGRAM(s)/deciliter  OLANZapine Injectable 2.5 milliGRAM(s) IntraMuscular every 6 hours PRN agitation      ALLERGIES:  Allergies    No Known Allergies    Intolerances        LABS:                        11.2   3.97  )-----------( 243      ( 13 Nov 2022 08:17 )             33.9     11-13    143  |  109<H>  |  12  ----------------------------<  88  4.1   |  21<L>  |  0.80    Ca    9.3      13 Nov 2022 08:17  Phos  3.7     11-13  Mg     2.0     11-13    TPro  7.0  /  Alb  4.1  /  TBili  1.2  /  DBili  x   /  AST  20  /  ALT  9<L>  /  AlkPhos  63  11-13        RADIOLOGY & ADDITIONAL TESTS: Reviewed.

## 2022-11-13 NOTE — PROGRESS NOTE ADULT - ASSESSMENT
88 y/o woman with PMH of Alzheimer's dementia. HLD, HTN presenting with 2 days of confusion, agitation likely in the setting of acute delirium from toxic metabolic encephalopathy i/s/o covid infection       86 y/o woman with PMH of Alzheimer's dementia. HLD, HTN presenting with 2 days of confusion, agitation likely in the setting of acute delirium and metabolic encephalopathy 2/2 acute changes in pt's environment, covid infection, and progressing dementia.

## 2022-11-13 NOTE — PROGRESS NOTE ADULT - ASSESSMENT
86 y/o woman with PMH of Alzheimer's dementia. HLD, HTN presenting with 2 days of confusion, agitation likely in the setting of acute delirium and metabolic encephalopathy 2/2 acute changes in pt's environment, covid infection, and progressing dementia.

## 2022-11-13 NOTE — PROGRESS NOTE ADULT - PROBLEM SELECTOR PLAN 2
Asymptomatic, not COVID vaccinated. She will need treatment as she is a high risk patient.     (H), ferritin and CRP wnl  - Remdesivir 100mg q24h Asymptomatic, not COVID vaccinated. She will need treatment as she is a high risk patient. Unclear if AMS was from COVID encephalopathy.     -  (H), ferritin and CRP wnl  - C/w Remdesivir 100mg q24h (11/11- )

## 2022-11-13 NOTE — PROGRESS NOTE ADULT - PROBLEM SELECTOR PLAN 1
Baseline A&O x1, acute worsening in past 2 days. Possibly i/s/o change in environment staying with daughter instead of son-in-law, given history of progressive dementia and Alzheimer's disease. W/u for AMS negative for structural causes/ICH, no history of drug use, minimal EtOH use, no trauma history. CMP unrevealing. No sign of CNS infection. COVID+ and unvaccinated, likely contributing to AMS.   Stroke code called for decreased responsiveness, negative for acute stroke. Admitting AMS etiology unclear, possibly 2/2 worsening dementia vs COVID. Acute decreased responsiveness likely 2/2 polypharmacy on top of other causes.    - TSH 4.3-->3.4 (wnl), B12 wnl, HIV neg  - f/u MRI brain non-con- r/o PRES  - F/u Utox  - vEEG: no epileptiform activity.  - Melatonin 5mg for sleep  - Zyprexa 2.5 mg IM prn for agitation  - Avoid giving haldol for now given unclear history of LBD Baseline A&O x1, acute worsening in past 2 days. Possibly i/s/o change in environment staying with daughter instead of son-in-law, given history of progressive dementia and Alzheimer's disease. W/u for AMS negative for structural causes/ICH, no history of drug use, minimal EtOH use, no trauma history. CMP unrevealing. No sign of CNS infection. COVID+ and unvaccinated, likely contributing to AMS.   Stroke code called for decreased responsiveness, negative for acute stroke. Admitting AMS etiology unclear, possibly 2/2 worsening dementia vs COVID. Acute decreased responsiveness likely 2/2 polypharmacy on top of other causes.    - TSH 4.3-->3.4 (wnl), B12 wnl, HIV neg  - vEEG: no epileptiform activity  - f/u MRI brain non-con (r/o PRES)  - f/u syphilis   - Melatonin 5mg for sleep  - Zyprexa 2.5 mg IM prn for agitation  - Avoid giving haldol for now given unclear history of Lewy Body Dementia

## 2022-11-13 NOTE — BH CONSULTATION LIAISON ASSESSMENT NOTE - SUMMARY
This is an 88 yo F, domiciled with son, with a PMH of alzheimer’s disease, HLD, HTN, and no known PPH, baseline mental status of orientation to self and family, who was BIB EMS a/b family due to change in mental status, gait disturbances, and combativeness. While in ED, pt found to be COVID+ and unvaccinated. Psychiatry consulted to evaluate for and aid in the management of delirium and agitation. On exam, pt with no acute psychiatric symptomatology including depression, anhedonia, SI/HI/AH/VH/PI. She is calm, pleasant, cooperative, engaged, Mental status significant for complete disorientation, though pt calm and pleasant despite confusion. Overall impression is major neurocognitive disorder with r/o superimposed delirium multifactorial in etiology (COVID, polypharmacy, toxic, metabolic disturbances, etc.). Pt with some acute and chronic risk factors for harm to self or others including dementia, confusion, frailty, inability to care for self, and perhaps others, though these are mitigated in part by various protective factors including involved family, regular supervision, absence of SI/HI, and perhaps others. No evidence that pt at acutely elevated risk of harm to self or others though given her advanced dementia, poor functional status, and total dependence on others, pt would benefit from enhanced supervision.     RECOMMENDATOINS  - Pt should have enhanced supervision for safety; no indication for psychiatric CO 1:1.  - No indication for standing pharmacotherapy.  - Consider quetiapine 12.5 mg PO q12h PRN for agitation.  - If pt acutely agitated and is unable to be verbally redirected, consider haloperidol 0.5 mg IM for treatment of agitation. Hold for QTc > 500 ms.  - Avoid use of benzodiazepines or anticholinergic agents as these can worsen delirium.  - Delirium precautions: window bed with blinds drawn, manage pain, monitor for urinary retention, ensure bowel movements daily or every other day. minimize use of tethers or lines, minimize nighttime awakenings.  - No psychiatric barriers to discharge. Pt does not require psychiatric hospitalization.   - Feel free to re-consult psychiatry with questions as needed.  - Case d/w APOD Dr. Olmstead

## 2022-11-13 NOTE — EEG REPORT - NS EEG TEXT BOX
[facility] Department of Neurology  Inpatient Continuous video-Electroencephalogram    Patient Name:	ZANDRA HARMAN    :	1935  MRN:	[id number]    Study Start Date/Time:  2022, 5:42:10 PM  Study End Date/Time:    Referred by: Rea Ba MD    Brief Clinical History:  ZANDRA HARMAN is a 87 year old Female; study performed to investigate for seizures or markers of epilepsy.    Diagnosis Code:   R56.9 convulsions/seizure  The live video was: unmonitored.    Pertinent Medications:  n/a    Acquisition Details:  Electroencephalography was acquired using a minimum of 21 channels on an [employer] system v 8.5.1 with electrode placement according to the standard International 10-20 system following ACNS (American Clinical Neurophysiology Society) guidelines for Long-Term Video EEG monitoring.  Anterior temporal T1 and T2 electrodes were utilized whenever possible.   The [employer] automated spike & seizure detections were all reviewed in detail, in addition to extensive portions of raw EEG.      Day 1: 2022 @ 5:42:10 PM to next morning @ 07:00 am  Background:  continuous, with predominantly alpha/theta frequencies.  Symmetry:  No persistent asymmetries of voltage or frequency.  Posterior Dominant Rhythm: 7- 8 Hz fragmented.  Organization: Appropriate anterior-posterior gradient.  Voltage:  Normal (20+ uV)  Variability: Yes. 		Reactivity: Yes.  N2 sleep: Symmetric, synchronous spindles and K complexes.  Spontaneous Activity:  No epileptiform discharges.  Periodic/rhythmic activity:  None.  Events:  No electrographic seizures or significant clinical events.  Provocations:  Hyperventilation and Photic stimulation: was not performed.    Daily Summary:    Mild generalized   slowing suggestive of a Mild degree of diffuse or multifocal  dysfunction.  No epileptiform activity and no significant clinical events occurred.      Yina Glass MD  Attending Neurologist, [facility]      Daily Updates (from 07:00 am until 07:00 am):  Day 2: -2022  Background:  continuous, with predominantly alpha/theta frequencies.  Symmetry:  No persistent asymmetries of voltage or frequency.  Posterior Dominant Rhythm: 7- 8 Hz fragmented.  Organization: Appropriate anterior-posterior gradient.  Voltage:  Normal (20+ uV)  Variability: Yes. 		Reactivity: Yes.  N2 sleep: Symmetric, synchronous spindles and K complexes.  Spontaneous Activity:  No epileptiform discharges.  Periodic/rhythmic activity:  None.  Events:  No electrographic seizures or significant clinical events.  Provocations:  Hyperventilation and Photic stimulation: was not performed.    Daily Summary:    Mild generalized   slowing suggestive of a Mild degree of diffuse or multifocal  dysfunction.  No epileptiform activity and no significant clinical events occurred.      Yina Glass MD  Attending Neurologist, [facility]

## 2022-11-13 NOTE — PROGRESS NOTE ADULT - PROBLEM SELECTOR PLAN 7
F: None  E: Replenish PRN   Diet: regular diet  DVT: lovenox 30 mg qd  Dispo plan: 7L -> [facility] F: None  E: Replenish PRN   Diet: regular diet  DVT: lovenox 30 mg qd  Dispo: [facility]

## 2022-11-13 NOTE — PROGRESS NOTE ADULT - PROBLEM SELECTOR PLAN 7
F: D51/2 NS @ 70cc/hr  E: Replenish PRN   Diet: NPO  DVT: lovenox 30 mg qd  Dispo plan: 7L F: None  E: Replenish PRN   Diet: regular diet  DVT: lovenox 30 mg qd  Dispo plan: 7L -> [facility]
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
## 2022-11-13 NOTE — PROGRESS NOTE ADULT - PROBLEM SELECTOR PLAN 2
Asymptomatic, not COVID vaccinated. She will need treatment as she is a high risk patient.   (H), ferritin and CRP wnl  - Remdesivir
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
## 2022-11-13 NOTE — BH CONSULTATION LIAISON ASSESSMENT NOTE - CURRENT MEDICATION
MEDICATIONS  (STANDING):  aspirin  chewable 81 milliGRAM(s) Oral daily  atorvastatin 40 milliGRAM(s) Oral at bedtime  dextrose 5% + sodium chloride 0.45%. 1000 milliLiter(s) (70 mL/Hr) IV Continuous <Continuous>  dextrose 5%. 1000 milliLiter(s) (50 mL/Hr) IV Continuous <Continuous>  dextrose 5%. 1000 milliLiter(s) (100 mL/Hr) IV Continuous <Continuous>  dextrose 50% Injectable 25 Gram(s) IV Push once  dextrose 50% Injectable 12.5 Gram(s) IV Push once  dextrose 50% Injectable 25 Gram(s) IV Push once  donepezil 10 milliGRAM(s) Oral at bedtime  enoxaparin Injectable 30 milliGRAM(s) SubCutaneous every 24 hours  glucagon  Injectable 1 milliGRAM(s) IntraMuscular once  losartan 50 milliGRAM(s) Oral every 24 hours  metoprolol succinate ER 25 milliGRAM(s) Oral every 24 hours  NIFEdipine XL 30 milliGRAM(s) Oral every 24 hours  remdesivir  IVPB   IV Intermittent   remdesivir  IVPB 100 milliGRAM(s) IV Intermittent every 24 hours    MEDICATIONS  (PRN):  dextrose Oral Gel 15 Gram(s) Oral once PRN Blood Glucose LESS THAN 70 milliGRAM(s)/deciliter  OLANZapine Injectable 2.5 milliGRAM(s) IntraMuscular every 6 hours PRN agitation

## 2022-11-13 NOTE — PROGRESS NOTE ADULT - ATTENDING COMMENTS
Pt awake and alert eating lunch. Continue remdesivir for COVID encephalopathy and behavioral health f/u. EEG f/u with epilepsy.

## 2022-11-13 NOTE — PROGRESS NOTE ADULT - PROBLEM SELECTOR PLAN 3
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 Per stroke team, likely chronic in nature.  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 Per stroke team, likely chronic in nature.  - On high intensity statin and asa 81mg

## 2022-11-13 NOTE — PROGRESS NOTE ADULT - PROBLEM SELECTOR PLAN 4
Home meds: Metoprolol, Nifedipine, losartan  - Started on IV meds given AMS and NPO status     - IV hydral 10mg q6hrs     - IV lopressor 5mg q6hrs     - IV enaliprat 0.625mg q6h Home meds: Metoprolol, Nifedipine, losartan  - Transitioned back to oral meds given improvement in mental status

## 2022-11-13 NOTE — BH CONSULTATION LIAISON ASSESSMENT NOTE - MSE UNSTRUCTURED FT
AA woman sitting up in bed, pleasant and cooperative, no PMR/A, speech nrrvt, mood fine, affect pleasant/congruent, TP linear/organized grossly, no delusions elicited, no PI, no SI/HI, no AH, insight poor, judgement impaired. Disoriented to person, place, time, situation.

## 2022-11-13 NOTE — BH CONSULTATION LIAISON ASSESSMENT NOTE - RISK ASSESSMENT
Pt with some acute and chronic risk factors for harm to self or others including dementia, confusion, frailty, inability to care for self, and perhaps others, though these are mitigated in part by various protective factors including involved family, regular supervision, absence of SI/HI, and perhaps others. No evidence that pt at acutely elevated risk of harm to self or others though given her advanced dementia, poor functional status, and total dependence on others, pt would benefit from enhanced supervision.

## 2022-11-13 NOTE — PROGRESS NOTE ADULT - PROBLEM SELECTOR PLAN 5
Quality 110: Preventive Care And Screening: Influenza Immunization: Influenza Immunization previously received during influenza season Quality 226: Preventive Care And Screening: Tobacco Use: Screening And Cessation Intervention: Patient screened for tobacco and never smoked Quality 134: Screening For Clinical Depression And Follow-Up Plan: The patient was screened for depression and the screen was positive and follow up plan documented Detail Level: Detailed Quality 111:Pneumonia Vaccination Status For Older Adults: Pneumococcal Vaccination Previously Received Quality 431: Preventive Care And Screening: Unhealthy Alcohol Use - Screening: Patient screened for unhealthy alcohol use using a single question and scores less than 2 times per year Home meds: On Rosuvastatin   - Holding for now, can c/w Atorvastatin 40 mg daily when pt taking PO meds  - Start ASA 81 when able Quality 131: Pain Assessment And Follow-Up: Pain assessment using a standardized tool is documented as negative, no follow-up plan required Home meds: On Rosuvastatin   - c/w Atorvastatin 40 mg daily and ASA 81mg

## 2022-11-13 NOTE — PROGRESS NOTE ADULT - PROBLEM SELECTOR PLAN 1
Baseline A&O x1, acute worsening in past 2 days. Possibly i/s/o change in environment staying with daughter instead of son-in-law, given history of progressive dementia and Alzheimer's disease. W/u for AMS negative for structural causes/ICH, no history of drug use, minimal EtOH use, no trauma history. CMP unrevealing. No sign of CNS infection.   COVID+ and unvaccinated, likely contributing to AMS.   Code stroke called for decreased responsiveness, negative for acute stroke. Admitting AMS etiology unclear, possibly 2/2 worsening dementia vs COVID. Acute decreased responsiveness likely 2/2 polypharmacy on top of other causes.  TSH 4.3-->3.4 (wnl), B12 wnl, HIV neg  - f/u MRI brain non-con- r/o PRES  - F/u Utox  - f/u vEEG  - Melatonin 5mg for sleep  - Zyprexa 2.5 mg IM prn for agitation  - Avoid giving haldol for now given unclear history of LBD Baseline A&O x1, acute worsening in past 2 days. Possibly i/s/o change in environment staying with daughter instead of son-in-law, given history of progressive dementia and Alzheimer's disease. W/u for AMS negative for structural causes/ICH, no history of drug use, minimal EtOH use, no trauma history. CMP unrevealing. No sign of CNS infection.   COVID+ and unvaccinated, likely contributing to AMS.   Code stroke called for decreased responsiveness, negative for acute stroke. Admitting AMS etiology unclear, possibly 2/2 worsening dementia vs COVID. Acute decreased responsiveness likely 2/2 polypharmacy on top of other causes.  TSH 4.3-->3.4 (wnl), B12 wnl, HIV neg  - f/u MRI brain non-con- r/o PRES  - F/u Utox  - f/u vEEG -> no epileptiform activity.  - Melatonin 5mg for sleep  - Zyprexa 2.5 mg IM prn for agitation  - Avoid giving haldol for now given unclear history of LBD

## 2022-11-13 NOTE — BH CONSULTATION LIAISON ASSESSMENT NOTE - NSBHCHARTREVIEWVS_PSY_A_CORE FT
Vital Signs Last 24 Hrs  T(C): 36.9 (13 Nov 2022 13:45), Max: 36.9 (13 Nov 2022 10:34)  T(F): 98.5 (13 Nov 2022 13:45), Max: 98.5 (13 Nov 2022 10:34)  HR: 93 (13 Nov 2022 13:45) (56 - 93)  BP: 172/59 (13 Nov 2022 13:45) (127/70 - 198/83)  BP(mean): 128 (13 Nov 2022 12:25) (84 - 128)  RR: 18 (13 Nov 2022 13:45) (16 - 31)  SpO2: 100% (13 Nov 2022 13:45) (98% - 100%)    Parameters below as of 13 Nov 2022 13:45  Patient On (Oxygen Delivery Method): room air

## 2022-11-13 NOTE — PROVIDER CONTACT NOTE (OTHER) - SITUATION
DK=140/63 on one arm and 201/79 in the other upper arm. Dr. Francis coming to bedside, awaiting orders afterward
Noted less responsive than baseline. unable to fully open eyes, upper and lower extremities drifting, unable to speak.

## 2022-11-14 DIAGNOSIS — Z51.5 ENCOUNTER FOR PALLIATIVE CARE: ICD-10-CM

## 2022-11-14 DIAGNOSIS — R53.81 OTHER MALAISE: ICD-10-CM

## 2022-11-14 DIAGNOSIS — G93.41 METABOLIC ENCEPHALOPATHY: ICD-10-CM

## 2022-11-14 DIAGNOSIS — Z71.89 OTHER SPECIFIED COUNSELING: ICD-10-CM

## 2022-11-14 DIAGNOSIS — R44.3 HALLUCINATIONS, UNSPECIFIED: ICD-10-CM

## 2022-11-14 LAB
ANION GAP SERPL CALC-SCNC: 16 MMOL/L — SIGNIFICANT CHANGE UP (ref 5–17)
BLD GP AB SCN SERPL QL: NEGATIVE — SIGNIFICANT CHANGE UP
BUN SERPL-MCNC: 16 MG/DL — SIGNIFICANT CHANGE UP (ref 7–23)
CALCIUM SERPL-MCNC: 9.3 MG/DL — SIGNIFICANT CHANGE UP (ref 8.4–10.5)
CHLORIDE SERPL-SCNC: 110 MMOL/L — HIGH (ref 96–108)
CO2 SERPL-SCNC: 18 MMOL/L — LOW (ref 22–31)
CREAT SERPL-MCNC: 0.86 MG/DL — SIGNIFICANT CHANGE UP (ref 0.5–1.3)
EGFR: 66 ML/MIN/1.73M2 — SIGNIFICANT CHANGE UP
GLUCOSE SERPL-MCNC: 101 MG/DL — HIGH (ref 70–99)
HCT VFR BLD CALC: 34.2 % — LOW (ref 34.5–45)
HGB BLD-MCNC: 11.4 G/DL — LOW (ref 11.5–15.5)
MAGNESIUM SERPL-MCNC: 2 MG/DL — SIGNIFICANT CHANGE UP (ref 1.6–2.6)
MCHC RBC-ENTMCNC: 31.2 PG — SIGNIFICANT CHANGE UP (ref 27–34)
MCHC RBC-ENTMCNC: 33.3 GM/DL — SIGNIFICANT CHANGE UP (ref 32–36)
MCV RBC AUTO: 93.7 FL — SIGNIFICANT CHANGE UP (ref 80–100)
NRBC # BLD: 0 /100 WBCS — SIGNIFICANT CHANGE UP (ref 0–0)
PHOSPHATE SERPL-MCNC: 4.1 MG/DL — SIGNIFICANT CHANGE UP (ref 2.5–4.5)
PLATELET # BLD AUTO: 220 K/UL — SIGNIFICANT CHANGE UP (ref 150–400)
POTASSIUM SERPL-MCNC: 4.2 MMOL/L — SIGNIFICANT CHANGE UP (ref 3.5–5.3)
POTASSIUM SERPL-SCNC: 4.2 MMOL/L — SIGNIFICANT CHANGE UP (ref 3.5–5.3)
RBC # BLD: 3.65 M/UL — LOW (ref 3.8–5.2)
RBC # FLD: 14.3 % — SIGNIFICANT CHANGE UP (ref 10.3–14.5)
RH IG SCN BLD-IMP: NEGATIVE — SIGNIFICANT CHANGE UP
SODIUM SERPL-SCNC: 144 MMOL/L — SIGNIFICANT CHANGE UP (ref 135–145)
WBC # BLD: 4.72 K/UL — SIGNIFICANT CHANGE UP (ref 3.8–10.5)
WBC # FLD AUTO: 4.72 K/UL — SIGNIFICANT CHANGE UP (ref 3.8–10.5)

## 2022-11-14 PROCEDURE — 99497 ADVNCD CARE PLAN 30 MIN: CPT | Mod: 25

## 2022-11-14 PROCEDURE — 99233 SBSQ HOSP IP/OBS HIGH 50: CPT | Mod: GC

## 2022-11-14 PROCEDURE — 99223 1ST HOSP IP/OBS HIGH 75: CPT

## 2022-11-14 RX ORDER — NIFEDIPINE 30 MG
60 TABLET, EXTENDED RELEASE 24 HR ORAL EVERY 24 HOURS
Refills: 0 | Status: DISCONTINUED | OUTPATIENT
Start: 2022-11-15 | End: 2022-11-17

## 2022-11-14 RX ADMIN — ATORVASTATIN CALCIUM 40 MILLIGRAM(S): 80 TABLET, FILM COATED ORAL at 22:52

## 2022-11-14 RX ADMIN — Medication 25 MILLIGRAM(S): at 13:30

## 2022-11-14 RX ADMIN — Medication 81 MILLIGRAM(S): at 13:30

## 2022-11-14 RX ADMIN — REMDESIVIR 500 MILLIGRAM(S): 5 INJECTION INTRAVENOUS at 20:02

## 2022-11-14 RX ADMIN — Medication 60 MILLIGRAM(S): at 06:42

## 2022-11-14 RX ADMIN — LOSARTAN POTASSIUM 50 MILLIGRAM(S): 100 TABLET, FILM COATED ORAL at 13:30

## 2022-11-14 RX ADMIN — ENOXAPARIN SODIUM 30 MILLIGRAM(S): 100 INJECTION SUBCUTANEOUS at 22:52

## 2022-11-14 RX ADMIN — DONEPEZIL HYDROCHLORIDE 10 MILLIGRAM(S): 10 TABLET, FILM COATED ORAL at 22:51

## 2022-11-14 NOTE — CONSULT NOTE ADULT - PROBLEM SELECTOR RECOMMENDATION 4
[facility] as above.  - DNR/DNI. BRANDY in chart  - Surrogate: Gadiel Phillips (son), [phone]    In addition to the E/M visit, an advance care planning meeting was performed. Start time: 12:30pm; End time: 12:50pm; Total time: 20min. A face to face meeting to discuss advance care planning was held today regarding: ZANDRA HARMAN. Primary decision maker: patient is unable to participate in decision making; Alternate/surrogate: Gadiel Phillips, patient's son. Discussed advance directives including, but not limited to, healthcare proxy and code status. Decision regarding code status: DNR/DNI; Documentation completed today: HAYLIE NAVA note

## 2022-11-14 NOTE — PROGRESS NOTE ADULT - ASSESSMENT
88 y/o woman with PMH of Alzheimer's dementia. HLD, HTN presenting with 2 days of confusion, agitation likely in the setting of acute delirium and metabolic encephalopathy 2/2 acute changes in pt's environment, covid infection, and progressing dementia.

## 2022-11-14 NOTE — PROGRESS NOTE ADULT - ASSESSMENT
per Internal Medicine    87 y o woman with PMH of Alzheimer's dementia. HLD, HTN presenting with 2 days of confusion, agitation likely in the setting of acute delirium and metabolic encephalopathy 2/2 acute changes in pt's environment, covid infection, and progressing dementia.    Problem/Plan - 1:  ·  Problem: Metabolic encephalopathy.   ·  Plan: Baseline A&O x1, acute worsening in past 2 days. Possibly i/s/o change in environment staying with daughter instead of son-in-law, given history of progressive dementia and Alzheimer's disease. W/u for AMS negative for structural causes/ICH, no history of drug use, minimal EtOH use, no trauma history. CMP unrevealing. No sign of CNS infection. COVID+ and unvaccinated, likely contributing to AMS.   Stroke code called for decreased responsiveness, negative for acute stroke. Admitting AMS etiology unclear, possibly 2/2 worsening dementia vs COVID. Acute decreased responsiveness likely 2/2 polypharmacy on top of other causes.    - TSH 4.3-->3.4 (wnl), B12 wnl, HIV neg  - vEEG: no epileptiform activity  - f/u MRI brain non-con outpatient (r/o PRES)  - f/u syphilis   - Melatonin 5mg for sleep  - quetiapine 12.5mg BID PRN for agitation  - if still not helping, can give haldol 0.5mg IM.    Problem/Plan - 2:  ·  Problem: 2019 novel coronavirus disease (COVID-19).   ·  Plan: Asymptomatic, not COVID vaccinated. She will need treatment as she is a high risk patient. Unclear if AMS was from COVID encephalopathy.     -  (H), ferritin and CRP wnl  - C/w Remdesivir 100mg q24h (11/11- ).    Problem/Plan - 3:  ·  Problem: Basilar artery stenosis.   ·  Plan: L vertebral artery occlusion  Intracranial left vertebral artery occlusion, multifocal moderate to severe stenosis of the basilar artery, and occlusion of the distal cervical left vertebral artery with multifocal areas of stenosis extending from the vertebral artery origin seen on CTA imaging. Per stroke team, likely chronic in nature.    - c/w atorvastatin 40mg qhs and ASA 81mg qd.    Problem/Plan - 4:  ·  Problem: HTN (hypertension).   ·  Plan: Home meds: Metoprolol, Nifedipine, losartan    - c/w home metoprolol 25mg qd and losartan 50mg qd  - increased nifedipine to 60mg qd.    Problem/Plan - 5:  ·  Problem: HLD (hyperlipidemia).   ·  Plan: Home meds: On Rosuvastatin   - c/w Atorvastatin 40 mg daily and ASA 81mg.    Problem/Plan - 6:  ·  Problem: Alzheimer's dementia.   ·  Plan: Home meds: Donepezil  - c/w donepezil 10 mg qhs.    Problem/Plan - 7:  ·  Problem: Prophylactic measure.   ·  Plan: F: None  E: Replenish PRN   Diet: regular diet  DVT: lovenox 30 mg qd  Dispo: [facility].

## 2022-11-14 NOTE — PROGRESS NOTE ADULT - SUBJECTIVE AND OBJECTIVE BOX
Physical Medicine and Rehabilitation Progress Note :       Patient is a 87y old  Female who presents with a chief complaint of     HPI:  The following history was obtained from the patient's son and ex-daughter in law as the patient was unable to provide history due to her altered mental status.  88 y/o woman with PMH of Alzheimer's dementia, HLD, HTN presenting with 2 days of confusion, agitation and combativeness. At baseline the patient knows her name and recognizes her family but does not know the date or her home address. She was dropped off by her son 2 days ago to her ex-daughter in law's place as he was travelling. On 11/9 the patient was well, she had dinner at her daughter in law's place and went to bed but was unable to sleep. She remained awake, pacing and talking to herself. She remained awake on the night of 11/9 and did not sleep the next day. According to the daughter in law she was confused on the 11/10 and was attempting to leave the house and would become combative when restrained. At the time she did not complain of any SOB, CP, cough, palpitations, abdominal pain, N/V, diarrhea, constipation, hematochezia, hematuria. hematemesis. However, the daughter noted that had become more wobbly than usual and would need to use the wall to support herself when walking, she did not have any urinary incontinence and was going to the bathroom whenever she needed to urinate, without any frequency, urgency or dysuria. The patient did not sustain any trauma or falls during prior or during this acute change in mental status.   She has not been vaccinated against covid.     VS: T 98, 51-58 Hr, 204-148/63-90, 99% on RA  Labs: CBC and CMP wnl, U/A negative, COVID +  Imaging:   CXR: Cardiomegaly, thoracic aortic calcification. Lungs and mediastinum are unremarkable. Right shoulder degenerative changes.  CT head:   No acute intracranial hemorrhage, mass effect, or CT evidence of recent transcortical infarction.  Interventions: Hydral 10 mg, Olanzapine 5 mg x3, Haloperidol 5 mg x1, versed 2mg x1   (11 Nov 2022 10:38)                            11.4   4.72  )-----------( 220      ( 14 Nov 2022 05:53 )             34.2       11-14    144  |  110<H>  |  16  ----------------------------<  101<H>  4.2   |  18<L>  |  0.86    Ca    9.3      14 Nov 2022 05:53  Phos  4.1     11-14  Mg     2.0     11-14    TPro  7.0  /  Alb  4.1  /  TBili  1.2  /  DBili  x   /  AST  20  /  ALT  9<L>  /  AlkPhos  63  11-13    Vital Signs Last 24 Hrs  T(C): 36.7 (14 Nov 2022 06:00), Max: 36.9 (13 Nov 2022 13:45)  T(F): 98.1 (14 Nov 2022 06:00), Max: 98.5 (13 Nov 2022 13:45)  HR: 90 (14 Nov 2022 06:00) (78 - 94)  BP: 157/61 (14 Nov 2022 06:00) (140/77 - 214/68)  BP(mean): --  RR: 19 (14 Nov 2022 06:00) (18 - 19)  SpO2: 96% (14 Nov 2022 06:00) (96% - 100%)    Parameters below as of 14 Nov 2022 06:00  Patient On (Oxygen Delivery Method): room air        MEDICATIONS  (STANDING):  aspirin  chewable 81 milliGRAM(s) Oral daily  atorvastatin 40 milliGRAM(s) Oral at bedtime  dextrose 5% + sodium chloride 0.45%. 1000 milliLiter(s) (70 mL/Hr) IV Continuous <Continuous>  dextrose 5%. 1000 milliLiter(s) (50 mL/Hr) IV Continuous <Continuous>  dextrose 5%. 1000 milliLiter(s) (100 mL/Hr) IV Continuous <Continuous>  dextrose 50% Injectable 25 Gram(s) IV Push once  dextrose 50% Injectable 12.5 Gram(s) IV Push once  dextrose 50% Injectable 25 Gram(s) IV Push once  donepezil 10 milliGRAM(s) Oral at bedtime  enoxaparin Injectable 30 milliGRAM(s) SubCutaneous every 24 hours  glucagon  Injectable 1 milliGRAM(s) IntraMuscular once  losartan 50 milliGRAM(s) Oral every 24 hours  metoprolol succinate ER 25 milliGRAM(s) Oral every 24 hours  remdesivir  IVPB   IV Intermittent   remdesivir  IVPB 100 milliGRAM(s) IV Intermittent every 24 hours    MEDICATIONS  (PRN):  dextrose Oral Gel 15 Gram(s) Oral once PRN Blood Glucose LESS THAN 70 milliGRAM(s)/deciliter      Initial Physical Therapy Functional Status Assessment :       Previous Level of Function:     · Ambulation Skills	independent  · Transfer Skills	independent  · ADL Skills	needed assist  · Additional Comments	Social history obtained from [facility]: Patient lives with her son, able to ambulate independently without assistance. Patient can bathe independently but needs assistance with cooking.    Cognitive Status Examination:   · Orientation	not oriented to person, place, time or situation; knows first name, no last; knows month of birthday, not day or year  · Level of Consciousness	confused  · Follows Commands and Answers Questions	25% of the time  · Personal Safety and Judgment	impaired  · Short Term Memory	impaired  · Long Term Memory	impaired    Range of Motion Exam:   · Range of Motion Examination	no ROM deficits were identified    Manual Muscle Testing:   · Manual Muscle Testing Results	difficult to assess 2/2 poor command following    Bed Mobility: Rolling/Turning:     · Level of [place]	maximum assist (25% patients effort)  · Physical Assist/Nonphysical Assist	1 person assist; verbal cues    Bed Mobility: Scooting/Bridging:     · Level of [place]	maximum assist (25% patients effort)  · Physical Assist/Nonphysical Assist	1 person assist; verbal cues    Bed Mobility: Sit to Supine:     · Level of [place]	maximum assist (25% patients effort)  · Physical Assist/Nonphysical Assist	1 person assist; verbal cues    Bed Mobility: Supine to Sit:     · Level of [place]	maximum assist (25% patients effort)  · Physical Assist/Nonphysical Assist	1 person assist; verbal cues    Bed Mobility Analysis:     · Bed Mobility Limitations	decreased ability to use arms for pushing/pulling; decreased ability to use legs for bridging/pushing; impaired ability to control trunk for mobility  · Impairments Contributing to Impaired Bed Mobility	impaired balance; cognition; decreased strength    Transfer: Sit to Stand:     · Level of [place]	maximum assist (25% patients effort)  · Physical Assist/Nonphysical Assist	1 person assist; verbal cues  · Assistive Device	hand held assist    Transfer: Stand to Sit:     · Level of [place]	maximum assist (25% patients effort)  · Physical Assist/Nonphysical Assist	1 person assist; verbal cues  · Assistive Device	hand held assist    Sit/Stand Transfer Safety Analysis:     · Transfer Safety Concerns Noted	decreased weight-shifting ability  · Impairments Contributing to Impaired Transfers	impaired balance; cognition; impaired postural control; decreased strength    Gait Skills:     · Level of [place]	unable to assess 2/2 poor standing tolerance    Balance Skills Assessment:     · Sitting Balance: Static	fair balance  · Sitting Balance: Dynamic	fair minus  · Sit-to-Stand Balance	poor balance  · Standing Balance: Static	poor balance  · Identified Impairments Contributing to Balance Disturbance	decreased strength; impaired postural control    Sensory Examination:   Sensory Examination:    Grossly Intact:   · Gross Sensory Examination	unable to assess 2/2 cognition      Clinical Impressions:   · Criteria for Skilled Therapeutic Interventions	impairments found; functional limitations in following categories  · Impairments Found (describe specific impairments)	aerobic capacity/endurance; gait, locomotion, and balance; muscle strength  · Rehab Potential	fair, will monitor progress closely  · Therapy Frequency	2-3x/week        PM&R Impression : as above    Current Disposition Plan Recommendations :    subacute rehab placement

## 2022-11-14 NOTE — PROGRESS NOTE ADULT - PROBLEM SELECTOR PLAN 4
Home meds: Metoprolol, Nifedipine, losartan    - c/w home metoprolol 25mg qd and losartan 50mg qd  - increased nifedipine to 60mg qd

## 2022-11-14 NOTE — CONSULT NOTE ADULT - PROBLEM SELECTOR RECOMMENDATION 2
Pt with advanced dementia, diagnosed 5 years ago. C/b hallucinations, wandering and behavioral disturbances. Requires help with most ADLs. Still ambulates independently.  - Treatment of hallucinations as above  - Son Gadiel Phillips is patient's main caregiver

## 2022-11-14 NOTE — CONSULT NOTE ADULT - SUBJECTIVE AND OBJECTIVE BOX
HPI:  The following history was obtained from the patient's son and ex-daughter in law as the patient was unable to provide history due to her altered mental status.  86 y/o woman with PMH of Alzheimer's dementia, HLD, HTN presenting with 2 days of confusion, agitation and combativeness. At baseline the patient knows her name and recognizes her family but does not know the date or her home address. She was dropped off by her son 2 days ago to her ex-daughter in law's place as he was travelling. On 11/9 the patient was well, she had dinner at her daughter in law's place and went to bed but was unable to sleep. She remained awake, pacing and talking to herself. She remained awake on the night of 11/9 and did not sleep the next day. According to the daughter in law she was confused on the 11/10 and was attempting to leave the house and would become combative when restrained. At the time she did not complain of any SOB, CP, cough, palpitations, abdominal pain, N/V, diarrhea, constipation, hematochezia, hematuria. hematemesis. However, the daughter noted that had become more wobbly than usual and would need to use the wall to support herself when walking, she did not have any urinary incontinence and was going to the bathroom whenever she needed to urinate, without any frequency, urgency or dysuria. The patient did not sustain any trauma or falls during prior or during this acute change in mental status.   She has not been vaccinated against covid.     VS: T 98, 51-58 Hr, 204-148/63-90, 99% on RA  Labs: CBC and CMP wnl, U/A negative, COVID +  Imaging:   CXR: Cardiomegaly, thoracic aortic calcification. Lungs and mediastinum are unremarkable. Right shoulder degenerative changes.  CT head:   No acute intracranial hemorrhage, mass effect, or CT evidence of recent transcortical infarction.  Interventions: Hydral 10 mg, Olanzapine 5 mg x3, Haloperidol 5 mg x1, versed 2mg x1   (11 Nov 2022 10:38)    Additional HPI:  Collateral from son Gadiel. Patient was diagnosed with dementia 5 years ago with her first symptoms being issues with her memory. About 3 years ago, he began to notice issues with ADLs and she was unable to fully take care of her IADLs, then she began having trouble with her ADLs. Now she has difficulty finding the bathroom in her own home, needs help bathing and making food. She lives alone but her son visits her daily to help her and has cameras inside her home to monitor her. She wanders and tries to leave her home alone on occasion but is still physically able to walk independently.    PERTINENT PM/SXH:   Dementia        FAMILY HISTORY:  No history of  in first degree relatives according to chart  Unable to obtain due to patient's encephalopathy    ITEMS NOT CHECKED ARE NOT PRESENT  SOCIAL HISTORY:   Significant other/partner:  []  Children:  [X] Gadiel Phillips (son), daughter in [place]   Substance hx:  []   Tobacco hx:  []   Alcohol hx: []   Home Opioid hx:  [X] I-Stop Reference No: [id number]  - no Rx's on ISTOP  Living Situation: [X]Home  []Long term care  []Rehab []Other  Tenriism/Spiritual practice: ; Role of organized Synagogue [X] important [ ] some [ ] unable to assess  Coping: [] well [] with difficulty [] poor coping [] unable to assess  Support system: [] strong [X] adequate [] inadequate    ADVANCE DIRECTIVES:    []MOLST  []Living Will  DECISION MAKER(s):  [] Health Care Proxy(s)  [X] Surrogate(s)  [] Guardian           Name(s)/Phone Number(s): Gadiel Phillips (son), [phone]    BASELINE (I)ADLs (prior to admission):  [place]: []Total  [] Moderate [X]Dependent    ALLERGIES:  No Known Allergies    MEDICATIONS  (STANDING):  aspirin  chewable 81 milliGRAM(s) Oral daily  atorvastatin 40 milliGRAM(s) Oral at bedtime  dextrose 5% + sodium chloride 0.45%. 1000 milliLiter(s) (70 mL/Hr) IV Continuous <Continuous>  dextrose 5%. 1000 milliLiter(s) (50 mL/Hr) IV Continuous <Continuous>  dextrose 5%. 1000 milliLiter(s) (100 mL/Hr) IV Continuous <Continuous>  dextrose 50% Injectable 25 Gram(s) IV Push once  dextrose 50% Injectable 12.5 Gram(s) IV Push once  dextrose 50% Injectable 25 Gram(s) IV Push once  donepezil 10 milliGRAM(s) Oral at bedtime  enoxaparin Injectable 30 milliGRAM(s) SubCutaneous every 24 hours  glucagon  Injectable 1 milliGRAM(s) IntraMuscular once  losartan 50 milliGRAM(s) Oral every 24 hours  metoprolol succinate ER 25 milliGRAM(s) Oral every 24 hours  remdesivir  IVPB   IV Intermittent   remdesivir  IVPB 100 milliGRAM(s) IV Intermittent every 24 hours    MEDICATIONS  (PRN):  dextrose Oral Gel 15 Gram(s) Oral once PRN Blood Glucose LESS THAN 70 milliGRAM(s)/deciliter      PRESENT SYMPTOMS/REVIEW OF SYSTEMS: [X]Unable to obtain due to poor mentation/encephalopathy  Source if other than patient:  []Family   []Team     Pain: [] yes [] no  QOL Impact -   Location -                    Aggravating Factors -  Quality -  Radiation -  Timing -  Severity (0-10 scale) -   Minimal Acceptable Level (0-10 scale) -    CPOT Score:  (Pain Assessment Scale for Critically Ill)    PAIN AD Score: 0  (Nonverbal Pain Assessment Scale)    Dyspnea:                           []Mild  []Moderate []Severe  Anxiety:                             []Mild []Moderate []Severe  Fatigue:                             []Mild []Moderate []Severe  Nausea:                             []Mild []Moderate []Severe  Loss of Appetite:              []Mild []Moderate []Severe  Constipation:                    []Mild []Moderate []Severe    Other Symptoms:  [x]All Other Review Of Systems Negative     Palliative Performance Status Version 2: 30 %  (Functional Assessment Tool)    PHYSICAL EXAM:  GENERAL:  [X]Alert  [X]Oriented x1   []Lethargic  []Cachexia  []Unarousable  [X]Verbal  []Non-Verbal  Behavioral:   []Anxiety  []Delirium []Agitation [X]Cooperative [X] Unfocused speech  HEENT:  [X]Normal   []Dry mouth   []ET Tube/Trach  []Oral lesions  PULMONARY:   [X]Clear []Tachypnea  []Audible excessive secretions  []Normal Work of Breathing []Labored Breathing  []Rhonchi []Crackles []Wheezing  CARDIOVASCULAR:    [X]Regular Rate & Rhythm []Irregular []Tachy  []Lucio  GASTROINTESTINAL:  [X]Soft  []Distended   []+BS  [X]Non tender []Tender  []PEG []OGT/ NGT  Last BM:  GENITOURINARY:  [ ]Normal [X] Incontinent   []Oliguria/Anuria   []Jarvis  MUSCULOSKELETAL:   []Normal   [X]Weakness  []Bed/Wheelchair bound []Edema  NEUROLOGIC:   []No focal deficits  [X]Cognitive impairment  []Dysphagia []Dysarthria []Paresis []Encephalopathic  SKIN:   [X]Normal   []Pressure ulcer(s)  []Rash    CRITICAL CARE:  []Shock Present  []Septic []Cardiogenic []Neurologic []Hypovolemic  []Vasopressors []Inotropes   []Respiratory failure present []Mechanical Ventilation []Non-invasive ventilatory support []High-Flow  []Acute  []Chronic []Hypoxic  []Hypercarbic  []Other organ failure    Vital Signs Last 24 Hrs  T(C): 36.7 (14 Nov 2022 06:00), Max: 36.9 (13 Nov 2022 13:45)  T(F): 98.1 (14 Nov 2022 06:00), Max: 98.5 (13 Nov 2022 13:45)  HR: 90 (14 Nov 2022 06:00) (78 - 94)  BP: 157/61 (14 Nov 2022 06:00) (140/77 - 214/68)  BP(mean): --  RR: 19 (14 Nov 2022 06:00) (18 - 19)  SpO2: 96% (14 Nov 2022 06:00) (96% - 100%)    Parameters below as of 14 Nov 2022 06:00  Patient On (Oxygen Delivery Method): room air        LABS:                        11.4   4.72  )-----------( 220      ( 14 Nov 2022 05:53 )             34.2   11-14    144  |  110<H>  |  16  ----------------------------<  101<H>  4.2   |  18<L>  |  0.86    Ca    9.3      14 Nov 2022 05:53  Phos  4.1     11-14  Mg     2.0     11-14    TPro  7.0  /  Alb  4.1  /  TBili  1.2  /  DBili  x   /  AST  20  /  ALT  9<L>  /  AlkPhos  63  11-13    RADIOLOGY & ADDITIONAL STUDIES:  < from: CT Angio Brain Stroke Protocol  w/ IV Cont (11.11.22 @ 14:53) >  IMPRESSION:    Intracranial CTA: Intracranial left vertebral artery occlusion.   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    < end of copied text >        REFERRALS:  [x]Social Work  []Case management []PT/OT []Chaplaincy  []Hospice  []Patient/Family Support []Massage Therapy []Music Therapy    DISCUSSION OF CASE: Family - to obtain additional history and to provide emotional support;  Primary medical team - collaterals  HPI:  The following history was obtained from the patient's son and ex-daughter in law as the patient was unable to provide history due to her altered mental status.  86 y/o woman with PMH of Alzheimer's dementia, HLD, HTN presenting with 2 days of confusion, agitation and combativeness. At baseline the patient knows her name and recognizes her family but does not know the date or her home address. She was dropped off by her son 2 days ago to her ex-daughter in law's place as he was travelling. On 11/9 the patient was well, she had dinner at her daughter in law's place and went to bed but was unable to sleep. She remained awake, pacing and talking to herself. She remained awake on the night of 11/9 and did not sleep the next day. According to the daughter in law she was confused on the 11/10 and was attempting to leave the house and would become combative when restrained. At the time she did not complain of any SOB, CP, cough, palpitations, abdominal pain, N/V, diarrhea, constipation, hematochezia, hematuria. hematemesis. However, the daughter noted that had become more wobbly than usual and would need to use the wall to support herself when walking, she did not have any urinary incontinence and was going to the bathroom whenever she needed to urinate, without any frequency, urgency or dysuria. The patient did not sustain any trauma or falls during prior or during this acute change in mental status.   She has not been vaccinated against covid.     VS: T 98, 51-58 Hr, 204-148/63-90, 99% on RA  Labs: CBC and CMP wnl, U/A negative, COVID +  Imaging:   CXR: Cardiomegaly, thoracic aortic calcification. Lungs and mediastinum are unremarkable. Right shoulder degenerative changes.  CT head:   No acute intracranial hemorrhage, mass effect, or CT evidence of recent transcortical infarction.  Interventions: Hydral 10 mg, Olanzapine 5 mg x3, Haloperidol 5 mg x1, versed 2mg x1   (11 Nov 2022 10:38)    Additional HPI:  Collateral from son Gadiel. Patient was diagnosed with dementia 5 years ago with her first symptoms being issues with her memory. About 3 years ago, he began to notice issues with ADLs and she was unable to fully take care of her IADLs, then she began having trouble with her ADLs. Now she has difficulty finding the bathroom in her own home, needs help bathing and making food. She lives alone but her son visits her daily to help her and has cameras inside her home to monitor her. She wanders and tries to leave her home alone on occasion but is still physically able to walk independently.    PERTINENT PM/SXH:   Dementia        FAMILY HISTORY:  No history of  in first degree relatives according to chart  Unable to obtain due to patient's encephalopathy    ITEMS NOT CHECKED ARE NOT PRESENT  SOCIAL HISTORY:   Significant other/partner:  []  Children:  [X] Gadiel Phillips (son), daughter in [place]   Substance hx:  []   Tobacco hx:  []   Alcohol hx: []   Home Opioid hx:  [X] I-Stop Reference No: [id number]  - no Rx's on ISTOP  Living Situation: [X]Home  []Long term care  []Rehab []Other  Christianity/Spiritual practice: ; Role of organized Scientologist [X] important [ ] some [ ] unable to assess  Coping: [] well [] with difficulty [] poor coping [] unable to assess  Support system: [] strong [X] adequate [] inadequate    ADVANCE DIRECTIVES:    []MOLST  []Living Will  DECISION MAKER(s):  [] Health Care Proxy(s)  [X] Surrogate(s)  [] Guardian           Name(s)/Phone Number(s): Gadiel Phillips (son), [phone]    BASELINE (I)ADLs (prior to admission):  [place]: []Total  [] Moderate [X]Dependent    ALLERGIES:  No Known Allergies    MEDICATIONS  (STANDING):  aspirin  chewable 81 milliGRAM(s) Oral daily  atorvastatin 40 milliGRAM(s) Oral at bedtime  dextrose 5% + sodium chloride 0.45%. 1000 milliLiter(s) (70 mL/Hr) IV Continuous <Continuous>  dextrose 5%. 1000 milliLiter(s) (50 mL/Hr) IV Continuous <Continuous>  dextrose 5%. 1000 milliLiter(s) (100 mL/Hr) IV Continuous <Continuous>  dextrose 50% Injectable 25 Gram(s) IV Push once  dextrose 50% Injectable 12.5 Gram(s) IV Push once  dextrose 50% Injectable 25 Gram(s) IV Push once  donepezil 10 milliGRAM(s) Oral at bedtime  enoxaparin Injectable 30 milliGRAM(s) SubCutaneous every 24 hours  glucagon  Injectable 1 milliGRAM(s) IntraMuscular once  losartan 50 milliGRAM(s) Oral every 24 hours  metoprolol succinate ER 25 milliGRAM(s) Oral every 24 hours  remdesivir  IVPB   IV Intermittent   remdesivir  IVPB 100 milliGRAM(s) IV Intermittent every 24 hours    MEDICATIONS  (PRN):  dextrose Oral Gel 15 Gram(s) Oral once PRN Blood Glucose LESS THAN 70 milliGRAM(s)/deciliter      PRESENT SYMPTOMS/REVIEW OF SYSTEMS: [X]Unable to obtain due to poor mentation/encephalopathy  Source if other than patient:  []Family   []Team     Pain: [] yes [] no  QOL Impact -   Location -                    Aggravating Factors -  Quality -  Radiation -  Timing -  Severity (0-10 scale) -   Minimal Acceptable Level (0-10 scale) -    CPOT Score:  (Pain Assessment Scale for Critically Ill)    PAIN AD Score: 0  (Nonverbal Pain Assessment Scale)    Dyspnea:                           []Mild  []Moderate []Severe  Anxiety:                             []Mild []Moderate []Severe  Fatigue:                             []Mild []Moderate []Severe  Nausea:                             []Mild []Moderate []Severe  Loss of Appetite:              []Mild []Moderate []Severe  Constipation:                    []Mild []Moderate []Severe    Other Symptoms:  [x]All Other Review Of Systems Negative     Palliative Performance Status Version 2: 30 %  (Functional Assessment Tool)    PHYSICAL EXAM:  GENERAL:  [X]Alert  [X]Oriented x1   []Lethargic  []Cachexia  []Unarousable  [X]Verbal  []Non-Verbal  Behavioral:   []Anxiety  []Delirium []Agitation [X]Cooperative [X] Unfocused speech  HEENT:  [X]Normal   []Dry mouth   []ET Tube/Trach  []Oral lesions  PULMONARY:   [X]Clear []Tachypnea  []Audible excessive secretions  []Normal Work of Breathing []Labored Breathing  []Rhonchi []Crackles []Wheezing  CARDIOVASCULAR:    [X]Regular Rate & Rhythm []Irregular []Tachy  []Lucio  GASTROINTESTINAL:  [X]Soft  []Distended   []+BS  [X]Non tender []Tender  []PEG []OGT/ NGT  Last BM:  GENITOURINARY:  [ ]Normal [X] Incontinent   []Oliguria/Anuria   []Jarvis  MUSCULOSKELETAL:   []Normal   [X]Weakness  []Bed/Wheelchair bound []Edema  NEUROLOGIC:   []No focal deficits  [X]Cognitive impairment  []Dysphagia []Dysarthria []Paresis []Encephalopathic  SKIN:   [X]Normal   []Pressure ulcer(s)  []Rash    CRITICAL CARE:  []Shock Present  []Septic []Cardiogenic []Neurologic []Hypovolemic  []Vasopressors []Inotropes   []Respiratory failure present []Mechanical Ventilation []Non-invasive ventilatory support []High-Flow  []Acute  []Chronic []Hypoxic  []Hypercarbic  []Other organ failure    Vital Signs Last 24 Hrs  T(C): 36.7 (14 Nov 2022 06:00), Max: 36.9 (13 Nov 2022 13:45)  T(F): 98.1 (14 Nov 2022 06:00), Max: 98.5 (13 Nov 2022 13:45)  HR: 90 (14 Nov 2022 06:00) (78 - 94)  BP: 157/61 (14 Nov 2022 06:00) (140/77 - 214/68)  BP(mean): --  RR: 19 (14 Nov 2022 06:00) (18 - 19)  SpO2: 96% (14 Nov 2022 06:00) (96% - 100%)    Parameters below as of 14 Nov 2022 06:00  Patient On (Oxygen Delivery Method): room air        LABS:                        11.4   4.72  )-----------( 220      ( 14 Nov 2022 05:53 )             34.2   11-14    144  |  110<H>  |  16  ----------------------------<  101<H>  4.2   |  18<L>  |  0.86    Ca    9.3      14 Nov 2022 05:53  Phos  4.1     11-14  Mg     2.0     11-14    TPro  7.0  /  Alb  4.1  /  TBili  1.2  /  DBili  x   /  AST  20  /  ALT  9<L>  /  AlkPhos  63  11-13    RADIOLOGY & ADDITIONAL STUDIES:  < from: CT Angio Brain Stroke Protocol  w/ IV Cont (11.11.22 @ 14:53) >  IMPRESSION:    Intracranial CTA: Intracranial left vertebral artery occlusion.   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    < end of copied text >        REFERRALS:  [x]Social Work  []Case management []PT/OT []Chaplaincy  []Hospice  []Patient/Family Support []Massage Therapy []Music Therapy    DISCUSSION OF CASE: Family - to obtain additional history and to provide emotional support;  Primary medical team - discussion of plan of care and dispo HPI:  The following history was obtained from the patient's son and ex-daughter in law as the patient was unable to provide history due to her altered mental status.  88 y/o woman with PMH of Alzheimer's dementia, HLD, HTN presenting with 2 days of confusion, agitation and combativeness. At baseline the patient knows her name and recognizes her family but does not know the date or her home address. She was dropped off by her son 2 days ago to her ex-daughter in law's place as he was travelling. On 11/9 the patient was well, she had dinner at her daughter in law's place and went to bed but was unable to sleep. She remained awake, pacing and talking to herself. She remained awake on the night of 11/9 and did not sleep the next day. According to the daughter in law she was confused on the 11/10 and was attempting to leave the house and would become combative when restrained. At the time she did not complain of any SOB, CP, cough, palpitations, abdominal pain, N/V, diarrhea, constipation, hematochezia, hematuria. hematemesis. However, the daughter noted that had become more wobbly than usual and would need to use the wall to support herself when walking, she did not have any urinary incontinence and was going to the bathroom whenever she needed to urinate, without any frequency, urgency or dysuria. The patient did not sustain any trauma or falls during prior or during this acute change in mental status.   She has not been vaccinated against covid.     VS: T 98, 51-58 Hr, 204-148/63-90, 99% on RA  Labs: CBC and CMP wnl, U/A negative, COVID +  Imaging:   CXR: Cardiomegaly, thoracic aortic calcification. Lungs and mediastinum are unremarkable. Right shoulder degenerative changes.  CT head:   No acute intracranial hemorrhage, mass effect, or CT evidence of recent transcortical infarction.  Interventions: Hydral 10 mg, Olanzapine 5 mg x3, Haloperidol 5 mg x1, versed 2mg x1   (11 Nov 2022 10:38)    Additional HPI:  Collateral from son Gadiel. Patient was diagnosed with dementia 5 years ago with her first symptoms being issues with her memory. About 3 years ago, he began to notice issues with ADLs and she was unable to fully take care of her IADLs, then she began having trouble with her ADLs. Now she has difficulty finding the bathroom in her own home, needs help bathing and making food. She lives alone but her son visits her daily to help her and has cameras inside her home to monitor her. She wanders and tries to leave her home alone on occasion but is still physically able to walk independently.    PERTINENT PM/SXH:   Dementia        FAMILY HISTORY:  No history of  in first degree relatives according to chart  Unable to obtain due to patient's encephalopathy    ITEMS NOT CHECKED ARE NOT PRESENT  SOCIAL HISTORY:   Significant other/partner:  []  Children:  [X] Gadiel Phillips (son), daughter in [place]   Substance hx:  []   Tobacco hx:  []   Alcohol hx: []   Home Opioid hx:  [X] I-Stop Reference No: [id number]  - no Rx's on ISTOP  Living Situation: [X]Home  []Long term care  []Rehab []Other  Christianity/Spiritual practice: ; Role of organized Baptism [X] important [ ] some [ ] unable to assess  Coping: [] well [] with difficulty [] poor coping [] unable to assess  Support system: [] strong [X] adequate [] inadequate    ADVANCE DIRECTIVES:    []MOLST  []Living Will  DECISION MAKER(s):  [] Health Care Proxy(s)  [X] Surrogate(s)  [] Guardian           Name(s)/Phone Number(s): Gadiel Phillips (son), [phone]    BASELINE (I)ADLs (prior to admission):  [place]: []Total  [] Moderate [X]Dependent    ALLERGIES:  No Known Allergies    MEDICATIONS  (STANDING):  aspirin  chewable 81 milliGRAM(s) Oral daily  atorvastatin 40 milliGRAM(s) Oral at bedtime  dextrose 5% + sodium chloride 0.45%. 1000 milliLiter(s) (70 mL/Hr) IV Continuous <Continuous>  dextrose 5%. 1000 milliLiter(s) (50 mL/Hr) IV Continuous <Continuous>  dextrose 5%. 1000 milliLiter(s) (100 mL/Hr) IV Continuous <Continuous>  dextrose 50% Injectable 25 Gram(s) IV Push once  dextrose 50% Injectable 12.5 Gram(s) IV Push once  dextrose 50% Injectable 25 Gram(s) IV Push once  donepezil 10 milliGRAM(s) Oral at bedtime  enoxaparin Injectable 30 milliGRAM(s) SubCutaneous every 24 hours  glucagon  Injectable 1 milliGRAM(s) IntraMuscular once  losartan 50 milliGRAM(s) Oral every 24 hours  metoprolol succinate ER 25 milliGRAM(s) Oral every 24 hours  remdesivir  IVPB   IV Intermittent   remdesivir  IVPB 100 milliGRAM(s) IV Intermittent every 24 hours    MEDICATIONS  (PRN):  dextrose Oral Gel 15 Gram(s) Oral once PRN Blood Glucose LESS THAN 70 milliGRAM(s)/deciliter      PRESENT SYMPTOMS/REVIEW OF SYSTEMS: [X]Unable to obtain due to poor mentation/encephalopathy  Source if other than patient:  []Family   []Team     Pain: [] yes [] no  QOL Impact -   Location -                    Aggravating Factors -  Quality -  Radiation -  Timing -  Severity (0-10 scale) -   Minimal Acceptable Level (0-10 scale) -    CPOT Score:  (Pain Assessment Scale for Critically Ill)    PAIN AD Score: 0  (Nonverbal Pain Assessment Scale)    Dyspnea:                           []Mild  []Moderate []Severe  Anxiety:                             []Mild []Moderate []Severe  Fatigue:                             []Mild []Moderate []Severe  Nausea:                             []Mild []Moderate []Severe  Loss of Appetite:              []Mild []Moderate []Severe  Constipation:                    []Mild []Moderate []Severe    Other Symptoms:  [x]All Other Review Of Systems Negative     Palliative Performance Status Version 2: 50 %  (Functional Assessment Tool)    PHYSICAL EXAM:  GENERAL:  [X]Alert  [X]Oriented x1   []Lethargic  []Cachexia  []Unarousable  [X]Verbal  []Non-Verbal  Behavioral:   []Anxiety  []Delirium []Agitation [X]Cooperative [X] Unfocused speech  HEENT:  [X]Normal   []Dry mouth   []ET Tube/Trach  []Oral lesions  PULMONARY:   [X]Clear []Tachypnea  []Audible excessive secretions  []Normal Work of Breathing []Labored Breathing  []Rhonchi []Crackles []Wheezing  CARDIOVASCULAR:    [X]Regular Rate & Rhythm []Irregular []Tachy  []Lucio  GASTROINTESTINAL:  [X]Soft  []Distended   []+BS  [X]Non tender []Tender  []PEG []OGT/ NGT  Last BM:  GENITOURINARY:  [ ]Normal [X] Incontinent   []Oliguria/Anuria   []Jarvis  MUSCULOSKELETAL:   []Normal   [X]Weakness  []Bed/Wheelchair bound []Edema  NEUROLOGIC:   []No focal deficits  [X]Cognitive impairment  []Dysphagia []Dysarthria []Paresis []Encephalopathic [X] Visual hallucinations  SKIN:   [X]Normal   []Pressure ulcer(s)  []Rash    CRITICAL CARE:  []Shock Present  []Septic []Cardiogenic []Neurologic []Hypovolemic  []Vasopressors []Inotropes   []Respiratory failure present []Mechanical Ventilation []Non-invasive ventilatory support []High-Flow  []Acute  []Chronic []Hypoxic  []Hypercarbic  []Other organ failure    Vital Signs Last 24 Hrs  T(C): 36.7 (14 Nov 2022 06:00), Max: 36.9 (13 Nov 2022 13:45)  T(F): 98.1 (14 Nov 2022 06:00), Max: 98.5 (13 Nov 2022 13:45)  HR: 90 (14 Nov 2022 06:00) (78 - 94)  BP: 157/61 (14 Nov 2022 06:00) (140/77 - 214/68)  BP(mean): --  RR: 19 (14 Nov 2022 06:00) (18 - 19)  SpO2: 96% (14 Nov 2022 06:00) (96% - 100%)    Parameters below as of 14 Nov 2022 06:00  Patient On (Oxygen Delivery Method): room air        LABS:                        11.4   4.72  )-----------( 220      ( 14 Nov 2022 05:53 )             34.2   11-14    144  |  110<H>  |  16  ----------------------------<  101<H>  4.2   |  18<L>  |  0.86    Ca    9.3      14 Nov 2022 05:53  Phos  4.1     11-14  Mg     2.0     11-14    TPro  7.0  /  Alb  4.1  /  TBili  1.2  /  DBili  x   /  AST  20  /  ALT  9<L>  /  AlkPhos  63  11-13    RADIOLOGY & ADDITIONAL STUDIES:  < from: CT Angio Brain Stroke Protocol  w/ IV Cont (11.11.22 @ 14:53) >  IMPRESSION:    Intracranial CTA: Intracranial left vertebral artery occlusion.   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    < end of copied text >        REFERRALS:  [x]Social Work  []Case management []PT/OT []Chaplaincy  []Hospice  []Patient/Family Support []Massage Therapy []Music Therapy    DISCUSSION OF CASE: Family - to obtain additional history and to provide emotional support;  Primary medical team - discussion of plan of care and dispo

## 2022-11-14 NOTE — CONSULT NOTE ADULT - CONVERSATION DETAILS
Spoke with patient's son Gadiel Phillips over the phone regarding his mother's dementia. The patient also has a daughter but she lives in [place]. He endorsed that she has been declining cognitively since her diagnosis 5 years ago and that about 3 years ago, she began to lose her independence, being unable to do everything for herself and instead relying on her son for help. Prior to that, she was very active and independent and had always told him that she wouldn't want to be in a position where she had to be in a nursing home to take care of her. He feels that she would not want any interventions that would risk her functional or cognitive abilities or that would have a negative impact on her quality of life. I discussed with him his mother's code status and explained that given her age and her advanced dementia, she would have a very high likelihood of morbidity and decreased independence should she have a cardiac arrest. He explained that he didn't think she would want resuscitation if that were the case and he agreed to DNR and to allow a natural death if her heart stopped. He also agreed to DNI for the same reasons. I explained the role of a MOLST form and a MOLST was completed and placed in the chart.

## 2022-11-14 NOTE — PROGRESS NOTE ADULT - SUBJECTIVE AND OBJECTIVE BOX
INTERVAL HPI/OVERNIGHT EVENTS:  Patient was seen and examined at bedside. As per nurse and patient, no o/n events, patient resting comfortably. No complaints at this time. Patient denies: fever, chills, lightheadedness, weakness, CP, palpitations, SOB, cough, N/V. ROS otherwise negative.    VITAL SIGNS:  T(F): 98.1 (11-14-22 @ 06:00)  HR: 90 (11-14-22 @ 06:00)  BP: 157/61 (11-14-22 @ 06:00)  RR: 19 (11-14-22 @ 06:00)  SpO2: 96% (11-14-22 @ 06:00)  Wt(kg): --      11-13-22 @ 07:01  -  11-14-22 @ 07:00  --------------------------------------------------------  IN: 75 mL / OUT: 0 mL / NET: 75 mL        PHYSICAL EXAM:    Constitutional: resting comfortably in bed; NAD  HEENT: NC/AT, PER, anicteric sclera, no nasal discharge; MMM  Neck: supple; no JVD or thyromegaly  Respiratory: CTA B/L; no W/R/R, no retractions  Cardiac: +S1/S2; RRR; no M/R/G  Gastrointestinal: soft, NT/ND; no rebound or guarding  Back: spine midline, no bony tenderness or step-offs; no CVAT B/L  Extremities: WWP, no clubbing or cyanosis; no peripheral edema  Musculoskeletal: NROM x4; no joint swelling, tenderness or erythema  Vascular: 2+ radial, DP/PT pulses B/L  Dermatologic: skin warm, dry and intact; no rashes, wounds, or scars  Neurologic: AAOx3; CNII-XII grossly intact; no focal deficits  Psychiatric: affect and characteristics of appearance, verbalizations, behaviors are appropriate    MEDICATIONS  (STANDING):  aspirin  chewable 81 milliGRAM(s) Oral daily  atorvastatin 40 milliGRAM(s) Oral at bedtime  dextrose 5% + sodium chloride 0.45%. 1000 milliLiter(s) (70 mL/Hr) IV Continuous <Continuous>  dextrose 5%. 1000 milliLiter(s) (50 mL/Hr) IV Continuous <Continuous>  dextrose 5%. 1000 milliLiter(s) (100 mL/Hr) IV Continuous <Continuous>  dextrose 50% Injectable 25 Gram(s) IV Push once  dextrose 50% Injectable 12.5 Gram(s) IV Push once  dextrose 50% Injectable 25 Gram(s) IV Push once  donepezil 10 milliGRAM(s) Oral at bedtime  enoxaparin Injectable 30 milliGRAM(s) SubCutaneous every 24 hours  glucagon  Injectable 1 milliGRAM(s) IntraMuscular once  losartan 50 milliGRAM(s) Oral every 24 hours  metoprolol succinate ER 25 milliGRAM(s) Oral every 24 hours  NIFEdipine XL 60 milliGRAM(s) Oral daily  remdesivir  IVPB   IV Intermittent   remdesivir  IVPB 100 milliGRAM(s) IV Intermittent every 24 hours    MEDICATIONS  (PRN):  dextrose Oral Gel 15 Gram(s) Oral once PRN Blood Glucose LESS THAN 70 milliGRAM(s)/deciliter      Allergies    No Known Allergies    Intolerances        LABS:                        11.4   4.72  )-----------( 220      ( 14 Nov 2022 05:53 )             34.2     11-14    144  |  110<H>  |  16  ----------------------------<  101<H>  4.2   |  18<L>  |  0.86    Ca    9.3      14 Nov 2022 05:53  Phos  4.1     11-14  Mg     2.0     11-14    TPro  7.0  /  Alb  4.1  /  TBili  1.2  /  DBili  x   /  AST  20  /  ALT  9<L>  /  AlkPhos  63  11-13          RADIOLOGY & ADDITIONAL TESTS:  Reviewed INTERVAL HPI/OVERNIGHT EVENTS:  Patient was seen and examined at bedside. As per nurse and patient, no o/n events. Patient calm and eating yogurt with PCA. Patient denies: fever, chills, lightheadedness, weakness, CP, palpitations, SOB, cough, N/V. ROS otherwise negative.    VITAL SIGNS:  T(F): 98.1 (11-14-22 @ 06:00)  HR: 90 (11-14-22 @ 06:00)  BP: 157/61 (11-14-22 @ 06:00)  RR: 19 (11-14-22 @ 06:00)  SpO2: 96% (11-14-22 @ 06:00)  Wt(kg): --      11-13-22 @ 07:01  -  11-14-22 @ 07:00  --------------------------------------------------------  IN: 75 mL / OUT: 0 mL / NET: 75 mL        PHYSICAL EXAM:    Constitutional: resting comfortably in bed; NAD  HEENT: NC/AT, PER, anicteric sclera, no nasal discharge; MMM; neck supple  Respiratory: CTA B/L; no W/R/R, no retractions  Cardiac: +S1/S2; RRR; no M/R/G  Gastrointestinal: soft, NT/ND; no rebound or guarding  Extremities: WWP, no clubbing or cyanosis; no peripheral edema  Musculoskeletal: NROM x4; no joint swelling, tenderness or erythema  Vascular: 2+ radial, DP/PT pulses B/L  Dermatologic: skin warm, dry and intact; no rashes, wounds, or scars  Neurologic: AAOx1; CNII-XII grossly intact; no focal deficits    MEDICATIONS  (STANDING):  aspirin  chewable 81 milliGRAM(s) Oral daily  atorvastatin 40 milliGRAM(s) Oral at bedtime  dextrose 5% + sodium chloride 0.45%. 1000 milliLiter(s) (70 mL/Hr) IV Continuous <Continuous>  dextrose 5%. 1000 milliLiter(s) (50 mL/Hr) IV Continuous <Continuous>  dextrose 5%. 1000 milliLiter(s) (100 mL/Hr) IV Continuous <Continuous>  dextrose 50% Injectable 25 Gram(s) IV Push once  dextrose 50% Injectable 12.5 Gram(s) IV Push once  dextrose 50% Injectable 25 Gram(s) IV Push once  donepezil 10 milliGRAM(s) Oral at bedtime  enoxaparin Injectable 30 milliGRAM(s) SubCutaneous every 24 hours  glucagon  Injectable 1 milliGRAM(s) IntraMuscular once  losartan 50 milliGRAM(s) Oral every 24 hours  metoprolol succinate ER 25 milliGRAM(s) Oral every 24 hours  NIFEdipine XL 60 milliGRAM(s) Oral daily  remdesivir  IVPB   IV Intermittent   remdesivir  IVPB 100 milliGRAM(s) IV Intermittent every 24 hours    MEDICATIONS  (PRN):  dextrose Oral Gel 15 Gram(s) Oral once PRN Blood Glucose LESS THAN 70 milliGRAM(s)/deciliter      Allergies    No Known Allergies    Intolerances        LABS:                        11.4   4.72  )-----------( 220      ( 14 Nov 2022 05:53 )             34.2     11-14    144  |  110<H>  |  16  ----------------------------<  101<H>  4.2   |  18<L>  |  0.86    Ca    9.3      14 Nov 2022 05:53  Phos  4.1     11-14  Mg     2.0     11-14    TPro  7.0  /  Alb  4.1  /  TBili  1.2  /  DBili  x   /  AST  20  /  ALT  9<L>  /  AlkPhos  63  11-13          RADIOLOGY & ADDITIONAL TESTS:  Reviewed

## 2022-11-14 NOTE — PROGRESS NOTE ADULT - ATTENDING COMMENTS
Patient was seen and examined with the resident team today.  I agree with Dr. Tobias's assessment and plan with the following exceptions/additions:     Briefly, this is an 88yo woman with a PMH of advanced Alzheimer's dementia, essential HTN and HLD who p/w delirium v progression of her dementia in the setting of COVID-19 and subsequently oversedation for agitation.      #Advanced AD - Pall Care evaluated and now DNR/DNI; discuss with SW if longterm care is an option   #COVID-19 - c/w Remdemsivir; avoiding steroids due to delirium  #Essential HTN - c/w home meds  #DVT PPx - Lovenox   #Dispo - TBD    Kerrie Quiroga  [phone]

## 2022-11-14 NOTE — CONSULT NOTE ADULT - PROBLEM SELECTOR RECOMMENDATION 9
Pt with history of dementia-related hallucinations at home. Currently with active visual hallucinations.  - Quetiapine 12.5mg PO q12h PRN for agitation or aggression, or bothersome hallucinations  - Behavioral health following, recs appreciated

## 2022-11-14 NOTE — CONSULT NOTE ADULT - PROBLEM SELECTOR RECOMMENDATION 5
Following for GOC. Goals established. Will sign off.    Gavi Martinez MD  Palliative Care Attending  Geriatrics and Palliative Consult Service

## 2022-11-14 NOTE — PROGRESS NOTE ADULT - PROBLEM SELECTOR PLAN 2
Asymptomatic, not COVID vaccinated. She will need treatment as she is a high risk patient. Unclear if AMS was from COVID encephalopathy.     -  (H), ferritin and CRP wnl  - C/w Remdesivir 100mg q24h (11/11- )

## 2022-11-14 NOTE — CONSULT NOTE ADULT - SUBJECTIVE AND OBJECTIVE BOX
HPI:  The following history was obtained from the patient's son and ex-daughter in law as the patient was unable to provide history due to her altered mental status.  86 y/o woman with PMH of Alzheimer's dementia, HLD, HTN presenting with 2 days of confusion, agitation and combativeness. At baseline the patient knows her name and recognizes her family but does not know the date or her home address. She was dropped off by her son 2 days ago to her ex-daughter in law's place as he was travelling. On 11/9 the patient was well, she had dinner at her daughter in law's place and went to bed but was unable to sleep. She remained awake, pacing and talking to herself. She remained awake on the night of 11/9 and did not sleep the next day. According to the daughter in law she was confused on the 11/10 and was attempting to leave the house and would become combative when restrained. At the time she did not complain of any SOB, CP, cough, palpitations, abdominal pain, N/V, diarrhea, constipation, hematochezia, hematuria. hematemesis. However, the daughter noted that had become more wobbly than usual and would need to use the wall to support herself when walking, she did not have any urinary incontinence and was going to the bathroom whenever she needed to urinate, without any frequency, urgency or dysuria. The patient did not sustain any trauma or falls during prior or during this acute change in mental status.   She has not been vaccinated against covid.     VS: T 98, 51-58 Hr, 204-148/63-90, 99% on RA  Labs: CBC and CMP wnl, U/A negative, COVID +  Imaging:   CXR: Cardiomegaly, thoracic aortic calcification. Lungs and mediastinum are unremarkable. Right shoulder degenerative changes.  CT head:   No acute intracranial hemorrhage, mass effect, or CT evidence of recent transcortical infarction.  Interventions: Hydral 10 mg, Olanzapine 5 mg x3, Haloperidol 5 mg x1, versed 2mg x1   (11 Nov 2022 10:38)      PAST MEDICAL & SURGICAL HISTORY:  Dementia          FAMILY HISTORY:   Reviewed; no history of     in mother or father    Opiate Naive (Y/N):   iStop reviewed (Y/N): Yes.   No Rx found on iStop review (Ref#:           Items that are not checked are not present  PSYCHOSOCIAL ASSESSMENT:  - Significant other/partner: [  ]  Children: [  ]  - Living Situation: Home [  ] Long term care [  ]  Rehab[  ]  Other[  ]  - Support system: strong[  ] adequate[  ] inadequate[  ]  - Shinto/Spiritual practice:  - Role of organized Jain important [  ] some [  ] unable to assess dt pt mentation [  ]  - Coping: well[  ]  with difficulty[  ]  poor coping[  ]  unable to assess dt pt mentation [  ]  - What is most important to patient?  - What worries patient the most?  - Is patient at peace? :     ADVANCE DIRECTIVES:    - MOLST[  ]    Living Will [  ]   DECISION MAKER(s):  - Health Care Proxy(s) [  ]  Surrogate(s)[  ] Guardian[  ]           Name(s)/Phone Number(s):     BASELINE (I)ADLs (prior to admission):  - [place]:  Total[  ] Moderate[  ] Dependent[  ]    Allergies    No Known Allergies    Intolerances        Medications:      MEDICATIONS  (STANDING):  aspirin  chewable 81 milliGRAM(s) Oral daily  atorvastatin 40 milliGRAM(s) Oral at bedtime  dextrose 5% + sodium chloride 0.45%. 1000 milliLiter(s) (70 mL/Hr) IV Continuous <Continuous>  dextrose 5%. 1000 milliLiter(s) (50 mL/Hr) IV Continuous <Continuous>  dextrose 5%. 1000 milliLiter(s) (100 mL/Hr) IV Continuous <Continuous>  dextrose 50% Injectable 25 Gram(s) IV Push once  dextrose 50% Injectable 12.5 Gram(s) IV Push once  dextrose 50% Injectable 25 Gram(s) IV Push once  donepezil 10 milliGRAM(s) Oral at bedtime  enoxaparin Injectable 30 milliGRAM(s) SubCutaneous every 24 hours  glucagon  Injectable 1 milliGRAM(s) IntraMuscular once  losartan 50 milliGRAM(s) Oral every 24 hours  metoprolol succinate ER 25 milliGRAM(s) Oral every 24 hours  NIFEdipine XL 60 milliGRAM(s) Oral daily  remdesivir  IVPB   IV Intermittent   remdesivir  IVPB 100 milliGRAM(s) IV Intermittent every 24 hours    MEDICATIONS  (PRN):  dextrose Oral Gel 15 Gram(s) Oral once PRN Blood Glucose LESS THAN 70 milliGRAM(s)/deciliter      PRESENT SYMPTOMS:   [ ]Unable to obtain due to poor mentation   Source if other than patient:  [ ]Family   [ ]Team     Pain [  ]  Location :        Quality:  Radiation:  Timing:  Aggravating factors:  Minimal acceptable level (0-10 scale):   Severity in last 24h (0-10 scale) :  Current score (0-10 scale):  Improves with:     PAIN AD Score:   [email or web address] (press ctrl +  left click to view)    Analgesic Use (PRNs) for past 24 hours:      If [  ], pt denies symptom.   Dyspnea:         [  ]  Anxiety:           [  ]  Difficulty sleeping: [  ]  Fatigue:           [  ]  Nausea:           [  ]  Loss of appetite:     [  ]  Dysphagia: [  ]  Constipation:   [  ]        LBM   Grief Present   [  ] Yes   [  ] No   Other Symptoms:    All other review of systems negative [  ]    ECOG Performance:       Current Palliative Performance Scale/Karnofsky Score:    %  Preadmit Karnofsky:   %          PEx:  General: alert  oriented x       lethargic, distressed, cachexia,  nonverbal,  unarousable, verbal  Behavioral: Anxiety  Delirium Agitation Cooperative  HEENT: atraumatic, No abnormal lesions, No dry mouth,  temporal wasting, ET tube/trach  RESP: Reg rhythm,   tachypnea/labored breathing,   audible excessive secretions,  CTAB, diminished bilat bases  CV: RRR, S1S2,   tachycardia  GI: soft non distended non tender  incontinent               PEG/NG/OG tube                 constipation  last BM:   : normal  incontinent  oliguria/anuria  fountain  MUSK: weakness x4,  edema, ambulatory with assistance,   mostly/fully  BB/WC bound  SKIN: No abnormal skin lesions, Poor skin turgor, Pressure ulcer stage:   NEURO:  No deficits, cognitive impairment, encephalopathic dsyphagia dysarthria paresis  Oral intake ability: unable/only mouth care, minimal moderate full capability    T(C): 36.7 (11-14-22 @ 06:00), Max: 36.9 (11-13-22 @ 10:34)  HR: 90 (11-14-22 @ 06:00) (72 - 94)  BP: 157/61 (11-14-22 @ 06:00) (140/77 - 214/68)  RR: 19 (11-14-22 @ 06:00) (18 - 20)  SpO2: 96% (11-14-22 @ 06:00) (96% - 100%)  Wt(kg): --    Labs:    CBC:                        11.4   4.72  )-----------( 220      ( 14 Nov 2022 05:53 )             34.2     CMP:    11-14    144  |  110<H>  |  16  ----------------------------<  101<H>  4.2   |  18<L>  |  0.86    Ca    9.3      14 Nov 2022 05:53  Phos  4.1     11-14  Mg     2.0     11-14    TPro  7.0  /  Alb  4.1  /  TBili  1.2  /  DBili  x   /  AST  20  /  ALT  9<L>  /  AlkPhos  63  11-13           RADIOLOGY & ADDITIONAL STUDIES:  Imaging:  Reviewed  < from: CT Angio Neck Stroke Protocol w/ IV Cont (11.11.22 @ 14:54) >  IMPRESSION:  Intracranial CTA: Intracranial left vertebral artery occlusion.   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    < from: CT Perfusion w/ Maps w/ IV Cont (11.11.22 @ 14:51) >  IMPRESSION: Examination is degraded by motion. Perfusion metrics as above.    < from: CT Brain Stroke Protocol (11.11.22 @ 14:39) >  IMPRESSION:  No acute intracranial findings.  Findings were discussed with CHAY Pack of the patient's clinical team on   11/11/2022 at approximately 2:40 PM.    < from: CT Head No Cont (11.11.22 @ 01:28) >  IMPRESSION: No acute intracranial hemorrhage, mass effect, or CT evidence   of recent transcortical infarction.      PALLIATIVE MEDICINE COORDINATION OF CARE DOCUMENTATION: [x] Inpatient Consult  Non-Face-to-Face prolonged service provided that relates to (face-to-face) care that has or will occur and ongoing patient management, including one or more of the following: - Reviewed documentation from other physicians and other health care professional services - Reviewed medical records and diagnostic / radiology study results - Coordination with patient's support system  ************************************************************************  MEDICATION REVIEW:  - See Medication List Above    ISTOP REFERENCE and PRN usage  - as above   ------------------------------------------------------------------------  COORDINATION OF CARE:  - Palliative Care consulted for: GOC / Symptom Management  - Patient (to be) assessed: 11/14  - Patient previously seen by Palliative Care service: NO    ADVANCE CARE PLANNING  - Code status: FULL  - MOLST reviewed in chart: NONE; None found in Patient Window  - HCP/Surrogate: as above   - [facility] documents: NONE found   - HCP/ Living will/Other Advanced Directives in Alpha: NONE found on Alpha  ------------------------------------------------------------------------  CARE PROVIDER DOCUMENTATION:  - SW/CM notes: Remains medically active  - Primary team and Consultant notes reviewed     PLAN OF CARE  - Known admissions to [facility] in past year: current  - Current admit date: 11/11  - LOS: 3 days  - LACE score: 11   - Current dispo plan: TO BE DETERMINED    11-11-22 (3d)  ------------------------------------------------------------------------  - Time Spent/Chart reviewed: 31 Minutes [including time used to gather, review and transfer data]  - Start: 815  - End: 845    Prolonged services rendered, as part of this patient's care provided by Palliative Medicine, include: i. chart review for provider and ancillary service documentation, ii. pertinent diagnostics including laboratory and imaging studies, iii. medication review including PRN use, iv. admission history including previous palliative care encounters and GO notes, v. advance care planning documents including HCP and MOLST forms in Alpha. Part of Palliative Medicine extended evaluation and management also involves coordination of care with our IDT, the primary and consulting teams, and unit CM/SW and Hospice if eligible. Recommendations based on the information gathered and discussed are outlined in the A/P of Palliative notes.

## 2022-11-14 NOTE — PROGRESS NOTE ADULT - PROBLEM SELECTOR PLAN 1
Baseline A&O x1, acute worsening in past 2 days. Possibly i/s/o change in environment staying with daughter instead of son-in-law, given history of progressive dementia and Alzheimer's disease. W/u for AMS negative for structural causes/ICH, no history of drug use, minimal EtOH use, no trauma history. CMP unrevealing. No sign of CNS infection. COVID+ and unvaccinated, likely contributing to AMS.   Stroke code called for decreased responsiveness, negative for acute stroke. Admitting AMS etiology unclear, possibly 2/2 worsening dementia vs COVID. Acute decreased responsiveness likely 2/2 polypharmacy on top of other causes.    - TSH 4.3-->3.4 (wnl), B12 wnl, HIV neg  - vEEG: no epileptiform activity  - f/u MRI brain non-con outpatient (r/o PRES)  - f/u syphilis   - Melatonin 5mg for sleep  - quetiapine 12.5mg BID PRN for agitation  - if still not helping, can give haldol 0.5mg IM

## 2022-11-15 LAB
GLUCOSE BLDC GLUCOMTR-MCNC: 83 MG/DL — SIGNIFICANT CHANGE UP (ref 70–99)
RPR SER-TITR: (no result)
RPR SERPL-ACNC: REACTIVE
T PALLIDUM AB TITR SER: POSITIVE

## 2022-11-15 PROCEDURE — 99232 SBSQ HOSP IP/OBS MODERATE 35: CPT | Mod: GC

## 2022-11-15 RX ADMIN — ENOXAPARIN SODIUM 30 MILLIGRAM(S): 100 INJECTION SUBCUTANEOUS at 22:48

## 2022-11-15 RX ADMIN — ATORVASTATIN CALCIUM 40 MILLIGRAM(S): 80 TABLET, FILM COATED ORAL at 22:47

## 2022-11-15 RX ADMIN — Medication 60 MILLIGRAM(S): at 07:28

## 2022-11-15 RX ADMIN — DONEPEZIL HYDROCHLORIDE 10 MILLIGRAM(S): 10 TABLET, FILM COATED ORAL at 22:48

## 2022-11-15 RX ADMIN — Medication 81 MILLIGRAM(S): at 13:31

## 2022-11-15 RX ADMIN — REMDESIVIR 500 MILLIGRAM(S): 5 INJECTION INTRAVENOUS at 19:41

## 2022-11-15 NOTE — PROGRESS NOTE ADULT - PROBLEM SELECTOR PLAN 1
Baseline A&O x1, acute worsening in past 2 days. Possibly i/s/o change in environment staying with daughter instead of son-in-law, given history of progressive dementia and Alzheimer's disease. W/u for AMS negative for structural causes/ICH, no history of drug use, minimal EtOH use, no trauma history. CMP unrevealing. No sign of CNS infection. COVID+ and unvaccinated, likely contributing to AMS.   Stroke code called for decreased responsiveness, negative for acute stroke. Admitting AMS etiology unclear, possibly 2/2 worsening dementia vs COVID. Acute decreased responsiveness likely 2/2 polypharmacy on top of other causes.  vEEG: no epileptiform activity  TSH 4.3-->3.4 (wnl), B12 wnl, HIV neg, Syphilis RPR titer 1:2  - f/u MRI brain non-con outpatient (r/o PRES)  - Melatonin 5mg for sleep  - quetiapine 12.5mg BID PRN for agitation  - if still not helping, can give haldol 0.5mg IM

## 2022-11-15 NOTE — PROGRESS NOTE ADULT - SUBJECTIVE AND OBJECTIVE BOX
INTERVAL HPI/OVERNIGHT EVENTS:  Patient was seen and examined at bedside. As per nurse and patient, no o/n events, patient resting comfortably. No complaints at this time. Patient denies: fever, chills, lightheadedness, weakness, CP, palpitations, SOB, cough, N/V. ROS otherwise negative.    VITAL SIGNS:  T(F): 97.7 (11-15-22 @ 06:00)  HR: 62 (11-15-22 @ 06:00)  BP: 141/66 (11-15-22 @ 06:00)  RR: 17 (11-15-22 @ 06:00)  SpO2: 100% (11-15-22 @ 06:00)  Wt(kg): --        PHYSICAL EXAM:    Constitutional: resting comfortably in bed; NAD  HEENT: NC/AT, PER, anicteric sclera, no nasal discharge; MMM  Neck: supple; no JVD or thyromegaly  Respiratory: CTA B/L; no W/R/R, no retractions  Cardiac: +S1/S2; RRR; no M/R/G  Gastrointestinal: soft, NT/ND; no rebound or guarding  Back: spine midline, no bony tenderness or step-offs; no CVAT B/L  Extremities: WWP, no clubbing or cyanosis; no peripheral edema  Musculoskeletal: NROM x4; no joint swelling, tenderness or erythema  Vascular: 2+ radial, DP/PT pulses B/L  Dermatologic: skin warm, dry and intact; no rashes, wounds, or scars  Neurologic: AAOx3; CNII-XII grossly intact; no focal deficits  Psychiatric: affect and characteristics of appearance, verbalizations, behaviors are appropriate    MEDICATIONS  (STANDING):  aspirin  chewable 81 milliGRAM(s) Oral daily  atorvastatin 40 milliGRAM(s) Oral at bedtime  dextrose 5% + sodium chloride 0.45%. 1000 milliLiter(s) (70 mL/Hr) IV Continuous <Continuous>  dextrose 5%. 1000 milliLiter(s) (50 mL/Hr) IV Continuous <Continuous>  dextrose 5%. 1000 milliLiter(s) (100 mL/Hr) IV Continuous <Continuous>  dextrose 50% Injectable 25 Gram(s) IV Push once  dextrose 50% Injectable 12.5 Gram(s) IV Push once  dextrose 50% Injectable 25 Gram(s) IV Push once  donepezil 10 milliGRAM(s) Oral at bedtime  enoxaparin Injectable 30 milliGRAM(s) SubCutaneous every 24 hours  glucagon  Injectable 1 milliGRAM(s) IntraMuscular once  losartan 50 milliGRAM(s) Oral every 24 hours  metoprolol succinate ER 25 milliGRAM(s) Oral every 24 hours  NIFEdipine XL 60 milliGRAM(s) Oral every 24 hours  remdesivir  IVPB   IV Intermittent   remdesivir  IVPB 100 milliGRAM(s) IV Intermittent every 24 hours    MEDICATIONS  (PRN):  dextrose Oral Gel 15 Gram(s) Oral once PRN Blood Glucose LESS THAN 70 milliGRAM(s)/deciliter      Allergies    No Known Allergies    Intolerances        LABS:                        11.4   4.72  )-----------( 220      ( 14 Nov 2022 05:53 )             34.2     11-14    144  |  110<H>  |  16  ----------------------------<  101<H>  4.2   |  18<L>  |  0.86    Ca    9.3      14 Nov 2022 05:53  Phos  4.1     11-14  Mg     2.0     11-14    TPro  7.0  /  Alb  4.1  /  TBili  1.2  /  DBili  x   /  AST  20  /  ALT  9<L>  /  AlkPhos  63  11-13          RADIOLOGY & ADDITIONAL TESTS:  Reviewed INTERVAL HPI/OVERNIGHT EVENTS:  Patient was seen and examined at bedside. As per nurse and patient, no o/n events, patient resting comfortably. No complaints at this time. ROS unable to assess.    VITAL SIGNS:  T(F): 97.7 (11-15-22 @ 06:00)  HR: 62 (11-15-22 @ 06:00)  BP: 141/66 (11-15-22 @ 06:00)  RR: 17 (11-15-22 @ 06:00)  SpO2: 100% (11-15-22 @ 06:00)  Wt(kg): --        PHYSICAL EXAM:    Constitutional: resting comfortably in bed; NAD  HEENT: NC/AT, PER, anicteric sclera, no nasal discharge; MMM; neck supple  Respiratory: CTA B/L; no W/R/R, no retractions  Cardiac: +S1/S2; RRR; no M/R/G  Gastrointestinal: soft, NT/ND; no rebound or guarding  Extremities: WWP, no clubbing or cyanosis; no peripheral edema  Musculoskeletal: NROM x4; no joint swelling, tenderness or erythema  Vascular: 2+ radial, DP/PT pulses B/L  Dermatologic: skin warm, dry and intact; no rashes, wounds, or scars  Neurologic: AAOx1; CNII-XII grossly intact; no focal deficits    MEDICATIONS  (STANDING):  aspirin  chewable 81 milliGRAM(s) Oral daily  atorvastatin 40 milliGRAM(s) Oral at bedtime  dextrose 5% + sodium chloride 0.45%. 1000 milliLiter(s) (70 mL/Hr) IV Continuous <Continuous>  dextrose 5%. 1000 milliLiter(s) (50 mL/Hr) IV Continuous <Continuous>  dextrose 5%. 1000 milliLiter(s) (100 mL/Hr) IV Continuous <Continuous>  dextrose 50% Injectable 25 Gram(s) IV Push once  dextrose 50% Injectable 12.5 Gram(s) IV Push once  dextrose 50% Injectable 25 Gram(s) IV Push once  donepezil 10 milliGRAM(s) Oral at bedtime  enoxaparin Injectable 30 milliGRAM(s) SubCutaneous every 24 hours  glucagon  Injectable 1 milliGRAM(s) IntraMuscular once  losartan 50 milliGRAM(s) Oral every 24 hours  metoprolol succinate ER 25 milliGRAM(s) Oral every 24 hours  NIFEdipine XL 60 milliGRAM(s) Oral every 24 hours  remdesivir  IVPB   IV Intermittent   remdesivir  IVPB 100 milliGRAM(s) IV Intermittent every 24 hours    MEDICATIONS  (PRN):  dextrose Oral Gel 15 Gram(s) Oral once PRN Blood Glucose LESS THAN 70 milliGRAM(s)/deciliter      Allergies    No Known Allergies    Intolerances        LABS:                        11.4   4.72  )-----------( 220      ( 14 Nov 2022 05:53 )             34.2     11-14    144  |  110<H>  |  16  ----------------------------<  101<H>  4.2   |  18<L>  |  0.86    Ca    9.3      14 Nov 2022 05:53  Phos  4.1     11-14  Mg     2.0     11-14    TPro  7.0  /  Alb  4.1  /  TBili  1.2  /  DBili  x   /  AST  20  /  ALT  9<L>  /  AlkPhos  63  11-13          RADIOLOGY & ADDITIONAL TESTS:  Reviewed

## 2022-11-15 NOTE — PROGRESS NOTE ADULT - PROBLEM SELECTOR PLAN 2
Asymptomatic, not COVID vaccinated. She will need treatment as she is a high risk patient. Unclear if AMS was from COVID encephalopathy.    (H), ferritin and CRP wnl.  - C/w Remdesivir 100mg q24h (11/11-11/15)

## 2022-11-15 NOTE — PROGRESS NOTE ADULT - ATTENDING COMMENTS
Patient was seen and examined with the resident team today.  I agree with Dr. Tobias's assessment and plan with the following exceptions/additions:     Briefly, this is an 86yo woman with a PMH of advanced Alzheimer's dementia, essential HTN and HLD who p/w delirium v progression of her dementia in the setting of COVID-19 and subsequently oversedation for agitation.  Clinically doing well now and waiting for auth for LONA.  Does not meet hospice criteria yet.       #Advanced AD - Pall Care evaluated and now DNR/DNI  #COVID-19 - c/w Remdemsivir; avoiding steroids due to delirium  #Essential HTN - c/w home meds  #DVT PPx - Lovenox   #Dispo - LONA, pending auth    Kerrie Quiroga  [phone] Patient was seen and examined with the resident team today.  I agree with Dr. Tobias's assessment and plan with the following exceptions/additions:     Briefly, this is an 88yo woman with a PMH of advanced Alzheimer's dementia, essential HTN and HLD who p/w delirium v progression of her dementia in the setting of COVID-19 and subsequently oversedation for agitation.  Clinically doing well now and waiting for auth for LONA.  Does not meet hospice criteria yet.       #Advanced AD - Pall Care evaluated and now DNR/DNI  #COVID-19 - last day of Remdemsivir; avoiding steroids due to delirium  #Essential HTN - c/w home meds  #DVT PPx - Lovenox   #Dispo - LONA, pending auth    Kerrie Quiroga  [phone]

## 2022-11-16 ENCOUNTER — TRANSCRIPTION ENCOUNTER (OUTPATIENT)
Age: 87
End: 2022-11-16

## 2022-11-16 PROCEDURE — 99233 SBSQ HOSP IP/OBS HIGH 50: CPT | Mod: GC

## 2022-11-16 RX ORDER — NIFEDIPINE 30 MG
1 TABLET, EXTENDED RELEASE 24 HR ORAL
Qty: 30 | Refills: 2
Start: 2022-11-16 | End: 2023-02-13

## 2022-11-16 RX ORDER — NIFEDIPINE 30 MG
1 TABLET, EXTENDED RELEASE 24 HR ORAL
Qty: 0 | Refills: 0 | DISCHARGE

## 2022-11-16 RX ADMIN — ATORVASTATIN CALCIUM 40 MILLIGRAM(S): 80 TABLET, FILM COATED ORAL at 21:19

## 2022-11-16 RX ADMIN — Medication 81 MILLIGRAM(S): at 11:55

## 2022-11-16 RX ADMIN — ENOXAPARIN SODIUM 30 MILLIGRAM(S): 100 INJECTION SUBCUTANEOUS at 21:19

## 2022-11-16 RX ADMIN — LOSARTAN POTASSIUM 50 MILLIGRAM(S): 100 TABLET, FILM COATED ORAL at 13:11

## 2022-11-16 RX ADMIN — Medication 60 MILLIGRAM(S): at 06:17

## 2022-11-16 RX ADMIN — Medication 25 MILLIGRAM(S): at 11:55

## 2022-11-16 RX ADMIN — DONEPEZIL HYDROCHLORIDE 10 MILLIGRAM(S): 10 TABLET, FILM COATED ORAL at 21:19

## 2022-11-16 NOTE — DISCHARGE NOTE PROVIDER - ATTENDING DISCHARGE PHYSICAL EXAMINATION:
Physical exam:  GENERAL: NAD, lying in bed comfortably  HEAD:  Atraumatic, Normocephalic  EYES: EOMI, PERRLA, conjunctiva and sclera clear  ENT: Moist mucous membranes  NECK: Supple, No JVD  CHEST/LUNG: Clear to auscultation bilaterally; No rales, rhonchi, wheezing, or rubs.  HEART: Regular rate and rhythm; S1+ S2+   ABDOMEN: Bowel sounds present; Soft, Nontender, Nondistended   EXTREMITIES:  2+ Peripheral Pulses, brisk capillary refill. No clubbing, cyanosis, or edema  NERVOUS SYSTEM:  Alert & Oriented x1 , speech clear   MSK: FROM all 4 extremities   SKIN: No rashes or lesions    88yo woman with a PMH of advanced Alzheimer's dementia, essential HTN and HLD who p/w delirium v progression of her dementia in the setting of COVID-19 and subsequently oversedation for agitation.  Clinically doing well now and waiting for auth for LONA.  Does not meet hospice criteria yet.       #Advanced AD - Pall Care evaluated and now DNR/DNI  #COVID-19 -s/p remdesivir   #Essential HTN - c/w home meds  #DVT PPx - Lovenox   #Dispo - LONA, pending auth   Physical exam:  GENERAL: NAD, lying in bed comfortably  HEAD:  Atraumatic, Normocephalic  EYES: EOMI, PERRLA, conjunctiva and sclera clear  ENT: Moist mucous membranes  NECK: Supple, No JVD  CHEST/LUNG: Clear to auscultation bilaterally; No rales, rhonchi, wheezing, or rubs.  HEART: Regular rate and rhythm; S1+ S2+   ABDOMEN: Bowel sounds present; Soft, Nontender, Nondistended   EXTREMITIES:  2+ Peripheral Pulses, brisk capillary refill. No clubbing, cyanosis, or edema  NERVOUS SYSTEM:  Alert & Oriented x1 , speech clear   MSK: FROM all 4 extremities   SKIN: No rashes or lesions    88yo woman with a PMH of advanced Alzheimer's dementia, essential HTN and HLD who p/w delirium v progression of her dementia in the setting of COVID-19 and subsequently oversedation for agitation.  Clinically doing well now and waiting for auth for LONA.  Does not meet hospice criteria yet.       #Advanced AD - Pall Care evaluated and now DNR/DNI  #COVID-19 -s/p remdesivir   #Essential HTN - c/w home meds  #DVT PPx - Lovenox   #Dispo - LONA    [place] Counseling: I discussed with the patient the risks of tildrakizumab including but not limited to immunosuppression, malignancy, posterior leukoencephalopathy syndrome, and serious infections.  The patient understands that monitoring is required including a PPD at baseline and must alert us or the primary physician if symptoms of infection or other concerning signs are noted.

## 2022-11-16 NOTE — PROGRESS NOTE ADULT - PROBLEM SELECTOR PLAN 2
Asymptomatic, not COVID vaccinated. She will need treatment as she is a high risk patient. Unclear if AMS was from COVID encephalopathy.    (H), ferritin and CRP wnl.  - completed course of Remdesivir 100mg q24h (11/11-11/15)  - continue to monitor O2 requirements.

## 2022-11-16 NOTE — PROGRESS NOTE ADULT - PROBLEM SELECTOR PLAN 4
Home meds: Metoprolol, Nifedipine, losartan  - c/w home metoprolol 25mg qd and losartan 50mg qd  - c/w nifedipine to 60mg qd

## 2022-11-16 NOTE — DIETITIAN INITIAL EVALUATION ADULT - OTHER CALCULATIONS
%IBW: 97; ABW used to calculate estimated needs d/t pt being between 80 and 120%IBW. Adjusted for advanced age, clinical condition, COVID-19, clinical judgment. Recommend aiming for upper end of needs.

## 2022-11-16 NOTE — DIETITIAN INITIAL EVALUATION ADULT - OTHER INFO
86 y/o woman with PMH of Alzheimer's dementia. HLD, HTN presenting with 2 days of confusion, agitation likely in the setting of acute delirium and metabolic encephalopathy 2/2 acute changes in pt's environment, covid infection, and progressing dementia.    Pt seen at bedside for initial assessment. Assessment limited d/t pt's mental status. Information obtained from EMR/RN/PCA. Denies nausea/vomiting at this time. Reports last bowel movement 11/14. Per chart, NKFA. Unable to obtain information of wt/ht changes PTA.  No edema documented at this time. No pressure ulcers documented at this time. Prasanna score=17. Pt appears with mild temporal wasting, though appears to be age-related. Based on ASPEN guidelines, pt does not meet criteria for malnutrition at this time. Education deferred at this time. According to the PCA, the pt completes more of her beverages than her food, and seems to have less interest in eating vs. drinking. However, PCA also mentions the patient's appetite has been better on some days vs others, eating about half of her food overall. Made aware RD remains available. RD to follow up. See nutrition recommendations below.

## 2022-11-16 NOTE — PROGRESS NOTE ADULT - SUBJECTIVE AND OBJECTIVE BOX
OVERNIGHT EVENTS: None    SUBJECTIVE:  Patient seen and examined at bedside, pleasant and conversational but A&O x 1.    ROS: Denies fever, chest pain, dyspnea,  abdominal pain, any discomfort.     Vital Signs Last 12 Hrs  T(F): 97 (11-16-22 @ 12:18), Max: 97.4 (11-16-22 @ 06:17)  HR: 67 (11-16-22 @ 12:18) (60 - 67)  BP: 148/75 (11-16-22 @ 12:18) (143/71 - 148/75)  BP(mean): --  RR: 18 (11-16-22 @ 12:18) (17 - 18)  SpO2: 100% (11-16-22 @ 12:18) (100% - 100%)  I&O's Summary    15 Nov 2022 07:01  -  16 Nov 2022 07:00  --------------------------------------------------------  IN: 0 mL / OUT: 100 mL / NET: -100 mL        PHYSICAL EXAM:  Constitutional: resting comfortably in bed; NAD  HEENT: NC/AT, PER, anicteric sclera, no nasal discharge; MMM; neck supple  Respiratory: CTA B/L; no W/R/R, no retractions  Cardiac: +S1/S2; RRR; no M/R/G  Gastrointestinal: soft, NT/ND; no rebound or guarding  Extremities: WWP, no clubbing or cyanosis; no peripheral edema  Musculoskeletal: NROM x4; no joint swelling, tenderness or erythema  Vascular: 2+ radial, DP/PT pulses B/L  Dermatologic: skin warm, dry and intact; no rashes, wounds, or scars  Neurologic: AAOx1; CNII-XII grossly intact; no focal deficits        LABS:      RADIOLOGY & ADDITIONAL TESTS:    MEDICATIONS  (STANDING):  aspirin  chewable 81 milliGRAM(s) Oral daily  atorvastatin 40 milliGRAM(s) Oral at bedtime  dextrose 5% + sodium chloride 0.45%. 1000 milliLiter(s) (70 mL/Hr) IV Continuous <Continuous>  dextrose 5%. 1000 milliLiter(s) (50 mL/Hr) IV Continuous <Continuous>  dextrose 5%. 1000 milliLiter(s) (100 mL/Hr) IV Continuous <Continuous>  dextrose 50% Injectable 25 Gram(s) IV Push once  dextrose 50% Injectable 12.5 Gram(s) IV Push once  dextrose 50% Injectable 25 Gram(s) IV Push once  donepezil 10 milliGRAM(s) Oral at bedtime  enoxaparin Injectable 30 milliGRAM(s) SubCutaneous every 24 hours  glucagon  Injectable 1 milliGRAM(s) IntraMuscular once  losartan 50 milliGRAM(s) Oral every 24 hours  metoprolol succinate ER 25 milliGRAM(s) Oral every 24 hours  NIFEdipine XL 60 milliGRAM(s) Oral every 24 hours    MEDICATIONS  (PRN):  dextrose Oral Gel 15 Gram(s) Oral once PRN Blood Glucose LESS THAN 70 milliGRAM(s)/deciliter

## 2022-11-16 NOTE — DIETITIAN INITIAL EVALUATION ADULT - ORAL NUTRITION SUPPLEMENTS
No complaints No complaints No complaints No complaints No complaints No complaints No complaints No complaints No complaints No complaints No complaints Ensure Enlive BID (700kcal, 40g pro)

## 2022-11-16 NOTE — DISCHARGE NOTE PROVIDER - HOSPITAL COURSE
Patient is __ yo M/F with past medical history of _____  Presented with _____, found to have _____  Problem List/Main Diagnoses (system-based):   Inpatient treatment course:   New medications:   Labs to be followed outpatient:   Exam to be followed outpatient:    HOSPITAL COURSE 88 y/o woman with PMH of Alzheimer's dementia (baseline ambulatory AAOx0-1, HLD, HTN presenting with 2 days of confusion, agitation, and insomnia. Given patient's dementia, history was obtained from patient's family. Pt last noted to be at baseline on 11/9, was unable to sleep that night. appeared confused on 11/10. Became combative when she was restrained from leaving the house.   Inpatient treatment course: CT head negative, no history of drug use and minimal alcohol use history, no head trauma, no metabolic abnormalities. Rapid response called on 11/11 for unresponsiveness, code stroke called with negative workup. Stepped up to telemetry unit for closer monitoring, started on vEEG monitoring, no signs of epileptiform activity. Leading theory for altered mental status consistent with acute delirium and metabolic encephalopathy. Etiology likely multifactorial consisting of acute changes in environment, covid infection, and progressing dementia. Pt's mental status improved over time, stable for stepdown to floors. Increased nifedipine to 60mg daily. Awaiting LONA placement.    Problem List/Main Diagnoses     ###Metabolic encephalopathy.   Baseline A&O x1, acute worsening in past 2 days. Possibly i/s/o change in environment staying with daughter instead of son-in-law, given history of progressive dementia and Alzheimer's disease. W/u for AMS negative for structural causes/ICH, no history of drug use, minimal EtOH use, no trauma history. CMP unrevealing. No sign of CNS infection. COVID+ and unvaccinated, likely contributing to AMS.   Stroke code called for decreased responsiveness, negative for acute stroke. Admitting AMS etiology unclear, possibly 2/2 worsening dementia vs COVID. Acute decreased responsiveness likely 2/2 polypharmacy on top of other causes.  vEEG: no epileptiform activity  TSH 4.3-->3.4 (wnl), B12 wnl, HIV neg, Syphilis RPR titer 1:2  - f/u MRI brain non-con outpatient (r/o PRES)    ###2019 novel coronavirus disease (COVID-19).   Asymptomatic, not COVID vaccinated. She will need treatment as she is a high risk patient. Unclear if AMS was from COVID encephalopathy.    (H), ferritin and CRP wnl. Completed remdesivir 100mg q24h (11/11-11/15).      ###Basilar artery stenosis.   Intracranial left vertebral artery occlusion, multifocal moderate to severe stenosis of the basilar artery, and occlusion of the distal cervical left vertebral artery with multifocal areas of stenosis extending from the vertebral artery origin seen on CTA imaging. Per stroke team, likely chronic in nature.  - c/w atorvastatin 40mg qhs and ASA 81mg qd.      ### HTN (hypertension).   - c/w home metoprolol 25mg qd and losartan 50mg qd  - Decreased nifedipine to 60mg qd.      ### HLD (hyperlipidemia).   - c/w home Rosuvastatin and ASA 81mg.      ###Alzheimer's dementia.   - c/w donepezil 10 mg qhs.      New medications: Nifedipine 60mg, Metoprolol 25mg  Exam to be followed outpatient: MRI brain non-con outpatient (r/o PRES)   HOSPITAL COURSE 88 y/o woman with PMH of Alzheimer's dementia (baseline ambulatory AAOx0-1, HLD, HTN presenting with 2 days of confusion, agitation, and insomnia. Given patient's dementia, history was obtained from patient's family. Pt last noted to be at baseline on 11/9, was unable to sleep that night. appeared confused on 11/10. Became combative when she was restrained from leaving the house.   Inpatient treatment course: CT head negative, no history of drug use and minimal alcohol use history, no head trauma, no metabolic abnormalities. Rapid response called on 11/11 for unresponsiveness, code stroke called with negative workup. Stepped up to telemetry unit for closer monitoring, started on vEEG monitoring, no signs of epileptiform activity. Leading theory for altered mental status consistent with acute delirium and metabolic encephalopathy. Etiology likely multifactorial consisting of acute changes in environment, covid infection, and progressing dementia. Pt's mental status improved over time, stable for stepdown to floors. Increased nifedipine to 90mg daily, now stable for LONA.     Problem List/Main Diagnoses     ###Metabolic encephalopathy.   Baseline A&O x1, acute worsening in past 2 days. Possibly i/s/o change in environment staying with daughter instead of son-in-law, given history of progressive dementia and Alzheimer's disease. W/u for AMS negative for structural causes/ICH, no history of drug use, minimal EtOH use, no trauma history. CMP unrevealing. No sign of CNS infection. COVID+ and unvaccinated, likely contributing to AMS.   Stroke code called for decreased responsiveness, negative for acute stroke. Admitting AMS etiology unclear, possibly 2/2 worsening dementia vs COVID. Acute decreased responsiveness likely 2/2 polypharmacy on top of other causes.  vEEG: no epileptiform activity  TSH 4.3-->3.4 (wnl), B12 wnl, HIV neg, Syphilis RPR titer 1:2  - f/u MRI brain non-con outpatient (r/o PRES)    ###2019 novel coronavirus disease (COVID-19).   Asymptomatic, not COVID vaccinated. She will need treatment as she is a high risk patient. Unclear if AMS was from COVID encephalopathy.    (H), ferritin and CRP wnl. Completed remdesivir 100mg q24h (11/11-11/15).      ###Basilar artery stenosis.   Intracranial left vertebral artery occlusion, multifocal moderate to severe stenosis of the basilar artery, and occlusion of the distal cervical left vertebral artery with multifocal areas of stenosis extending from the vertebral artery origin seen on CTA imaging. Per stroke team, likely chronic in nature.  - c/w atorvastatin 40mg qhs and ASA 81mg qd.      ### HTN (hypertension).   - c/w home metoprolol 25mg qd and losartan 50mg qd  - Decreased nifedipine to 90mg qd.      ### HLD (hyperlipidemia).   - c/w home Rosuvastatin and ASA 81mg.      ###Alzheimer's dementia.   - c/w donepezil 10 mg qhs.      New medications: Nifedipine 90mg, Metoprolol 25mg  Exam to be followed outpatient: MRI brain non-con outpatient (r/o PRES)

## 2022-11-16 NOTE — DISCHARGE NOTE PROVIDER - NSDCMRMEDTOKEN_GEN_ALL_CORE_FT
aspirin 81 mg oral delayed release tablet: 1 tab(s) orally once a day  donepezil 10 mg oral tablet: 1 tab(s) orally once a day (at bedtime)  losartan 50 mg oral tablet: 1 tab(s) orally once a day  Metoprolol Tartrate 25 mg oral tablet: 1 protein nitrogen unit orally once a day  NIFEdipine 30 mg oral tablet, extended release: 1 tab(s) orally once a day  rosuvastatin 40 mg oral tablet: 1 tab(s) orally once a day   aspirin 81 mg oral delayed release tablet: 1 tab(s) orally once a day  donepezil 10 mg oral tablet: 1 tab(s) orally once a day (at bedtime)  losartan 50 mg oral tablet: 1 tab(s) orally once a day  Metoprolol Tartrate 25 mg oral tablet: 1 protein nitrogen unit orally once a day  NIFEdipine 30 mg oral tablet, extended release: 2 tab(s) orally once a day  rosuvastatin 40 mg oral tablet: 1 tab(s) orally once a day   aspirin 81 mg oral delayed release tablet: 1 tab(s) orally once a day  donepezil 10 mg oral tablet: 1 tab(s) orally once a day (at bedtime)  losartan 50 mg oral tablet: 1 tab(s) orally once a day  Metoprolol Tartrate 25 mg oral tablet: 1 protein nitrogen unit orally once a day  NIFEdipine (Eqv-Procardia XL) 90 mg oral tablet, extended release: 1 tab(s) orally once a day  rosuvastatin 40 mg oral tablet: 1 tab(s) orally once a day

## 2022-11-16 NOTE — DIETITIAN INITIAL EVALUATION ADULT - PERTINENT MEDS FT
MEDICATIONS  (STANDING):  aspirin  chewable 81 milliGRAM(s) Oral daily  atorvastatin 40 milliGRAM(s) Oral at bedtime  dextrose 5% + sodium chloride 0.45%. 1000 milliLiter(s) (70 mL/Hr) IV Continuous <Continuous>  dextrose 5%. 1000 milliLiter(s) (50 mL/Hr) IV Continuous <Continuous>  dextrose 5%. 1000 milliLiter(s) (100 mL/Hr) IV Continuous <Continuous>  dextrose 50% Injectable 25 Gram(s) IV Push once  dextrose 50% Injectable 12.5 Gram(s) IV Push once  dextrose 50% Injectable 25 Gram(s) IV Push once  donepezil 10 milliGRAM(s) Oral at bedtime  enoxaparin Injectable 30 milliGRAM(s) SubCutaneous every 24 hours  glucagon  Injectable 1 milliGRAM(s) IntraMuscular once  losartan 50 milliGRAM(s) Oral every 24 hours  metoprolol succinate ER 25 milliGRAM(s) Oral every 24 hours  NIFEdipine XL 60 milliGRAM(s) Oral every 24 hours    MEDICATIONS  (PRN):  dextrose Oral Gel 15 Gram(s) Oral once PRN Blood Glucose LESS THAN 70 milliGRAM(s)/deciliter

## 2022-11-16 NOTE — DIETITIAN INITIAL EVALUATION ADULT - COLLABORATION WITH OTHER PROVIDERS
65yo F hx HIV (undetectable viral load), breast CA in remission, htn p/w 1 week of dec PO/loss of appetite, occasional n/v/d, intermittent generalized abd pain across upper abd, orthostatic lightheadedness, and foul smelling urine. No fever, back pain, or blood in stool. Was at PCP office today for sx and had +ua and had difficulty ambulating around office d/t gen weakness and pcp noticed that pt was sitting down and appeared lethargic w/ eyes closed but responded appropriately to questions and became more alert after sternal rubbing, pcp did not witness syncope/confusion as pt was always conscious and answering questions    PCP: Dr. Moreno
discussed with MD

## 2022-11-16 NOTE — PROGRESS NOTE ADULT - PROBLEM SELECTOR PLAN 1
Baseline A&O x1, acute worsening in past 2 days. Possibly i/s/o change in environment staying with daughter instead of son-in-law, given history of progressive dementia and Alzheimer's disease. W/u for AMS negative for structural causes/ICH, no history of drug use, minimal EtOH use, no trauma history. CMP unrevealing. No sign of CNS infection. COVID+ and unvaccinated, likely contributing to AMS.   Stroke code called for decreased responsiveness, negative for acute stroke. Admitting AMS etiology unclear, possibly 2/2 worsening dementia vs COVID vs acute delirium. Acute decreased responsiveness likely 2/2 polypharmacy on top of other causes.  vEEG: no epileptiform activity  TSH 4.3-->3.4 (wnl), B12 wnl, HIV neg, Syphilis RPR titer 1:2  - f/u MRI brain non-con outpatient (r/o PRES)  - Melatonin 5mg for sleep  - quetiapine 12.5mg BID PRN for agitation  - if still not helping, can give haldol 0.5mg IM

## 2022-11-16 NOTE — DIETITIAN INITIAL EVALUATION ADULT - NS FNS DIET ORDER
Diet, Soft and Bite Sized:   Supplement Feeding Modality:  Oral  Ensure Enlive Cans or Servings Per Day:  1       Frequency:  Two Times a day (11-16-22 @ 11:10)

## 2022-11-16 NOTE — DISCHARGE NOTE PROVIDER - PROVIDER TOKENS
PROVIDER:[TOKEN:[[id number]],SCHEDULEDAPPT:[12/07/2022],SCHEDULEDAPPTTIME:[02:20 PM],ESTABLISHEDPATIENT:[T]]

## 2022-11-16 NOTE — DIETITIAN INITIAL EVALUATION ADULT - ADD RECOMMEND
1. Continue with current diet. Texture per SLP/team   >>Add Ensure Enlive BID (700kcal, 40g pro)   2. Monitor %PO intake, diet tolerance.   >>Encourage PO intake as appropriate   3. Monitor lytes, replete prn. Monitor BG.   4. Trend weekly wts. Monitor skin integrity, s/sx GI distress.   5. Pain/BM regimen per team   6. RD diet edu reinforcement prn.   7. RD to remain available for additional nutrition interventions as needed.

## 2022-11-16 NOTE — DISCHARGE NOTE NURSING/CASE MANAGEMENT/SOCIAL WORK - PATIENT PORTAL LINK FT
You can access the FollowMyHealth Patient Portal offered by [facility] by registering at the following website: [email or web address]. By joining [facility]’s FollowMyHealth portal, you will also be able to view your health information using other applications (apps) compatible with our system.

## 2022-11-16 NOTE — DISCHARGE NOTE PROVIDER - CARE PROVIDER_API CALL
NAT CABRERA  Internal Medicine  [address] [id number]  Phone: [phone]  Fax: [phone]  Established Patient  Scheduled Appointment: 12/07/2022 02:20 PM

## 2022-11-16 NOTE — DISCHARGE NOTE PROVIDER - NSDCCPCAREPLAN_GEN_ALL_CORE_FT
PRINCIPAL DISCHARGE DIAGNOSIS  Diagnosis: Altered mental status  Assessment and Plan of Treatment: - In order to enhance patient's overall well-being and clinical course, please try avoiding benzodiazepines, anticholinergics, and antihistamines (Can cause worsening confusion/delirium). Additionally, continue reorientation, supportive care, maintaining regular sleep/wake cycle, and optimizing nutritional/medical factors. Recommend lights and TV off at night time, lights on during the day, and verbal deesculation/redirection if patient to become confused.      SECONDARY DISCHARGE DIAGNOSES  Diagnosis: Alzheimer's dementia  Assessment and Plan of Treatment: Dementia  WHAT YOU NEED TO KNOW:  Dementia is a condition that causes loss of memory, thought control, and judgment. Alzheimer disease is the most common cause of dementia. Other common causes are loss of blood flow or nerve damage in the brain, and long-term alcohol or drug use. Dementia cannot be cured or prevented, but treatment may slow or reduce your symptoms.   DISCHARGE INSTRUCTIONS:  Return to the emergency department if you or someone close to you notices:   You have signs of delirium, such as extreme confusion, and seeing or hearing things that are not there.  You become angry or violent, and cannot be calmed down.  You faint and cannot be woken.   You have increased confusion, behavior, or mood changes.  You have questions or concerns about your condition or care.  Take your medicine as directed. Follow up with your healthcare provider as directed: Write down your questions so you remember to ask them during your visits. Ask someone to go in with you if you have trouble understanding or remembering what your healthcare provider tells you. The person can take notes for you during the visit and go over the notes with you later.   Manage your dementia: You may begin to need an in-home aide to help you remember your daily tasks. Ask your healthcare provider for a list of organizations that can help. It is best to arrange for help while you are thinking clearly. The following may also help you manage your dementia:   Write daily schedules and routines. Record doctor appointments, times to take your medicines, meal times, or any other things to remember. Write down reminders to use the bathroom if you have trouble remembering. You may to ask someone to write things down for you.   Place clocks and calendars where you can see them. This will help you remember appointments and tasks.      Diagnosis: HTN (hypertension)  Assessment and Plan of Treatment: - Take your blood pressure medication as prescribed. Do not skip doses. If you forgot or you skipped a dose talk to your doctor for advise on when to take the next dose   - Check your blood pressure every day with a cuff. Write down the values and present them to your doctor at the next visit. If the values are persistently high your medication may need to be re-adjusted   - Avoid excessive salt in your diet as salt help the build-up of excessive fluid in your body which can increase the blood pressure   - maintain a healthy weight and diet. being overweight is a rsk factor for hypertension and losing weight can lower your blood pressure   - Avoid smoking and excessive alcohol intake   - call 911 or report to the emergency department if your blood pressure is very high and you have severe headache, blurry vision, weakness or numbness, slurred speech, vertigo or chest pain    Diagnosis: HLD (hyperlipidemia)  Assessment and Plan of Treatment: Follow a low fat diet. Continue taking your cholesterol medications as prescribed. Follow up with your Primary Care Doctor to continue having your cholesterol levels checked on a continual basis.

## 2022-11-16 NOTE — PROGRESS NOTE ADULT - ATTENDING COMMENTS
86 y/o woman with PMH of Alzheimer's dementia. HLD, HTN presenting with 2 days of confusion, agitation likely in the setting of acute delirium and metabolic encephalopathy 2/2 acute changes in pt's environment, covid infection, and progressing dementia. Pt is on covid isolation, awake alert, responds to questions appropriately. Ready for discharge, auth received

## 2022-11-17 VITALS
HEART RATE: 85 BPM | OXYGEN SATURATION: 100 % | SYSTOLIC BLOOD PRESSURE: 165 MMHG | RESPIRATION RATE: 18 BRPM | TEMPERATURE: 98 F | DIASTOLIC BLOOD PRESSURE: 77 MMHG

## 2022-11-17 PROBLEM — Z00.00 ENCOUNTER FOR PREVENTIVE HEALTH EXAMINATION: Status: ACTIVE | Noted: 2022-11-17

## 2022-11-17 PROCEDURE — 36415 COLL VENOUS BLD VENIPUNCTURE: CPT

## 2022-11-17 PROCEDURE — 86901 BLOOD TYPING SEROLOGIC RH(D): CPT

## 2022-11-17 PROCEDURE — 81001 URINALYSIS AUTO W/SCOPE: CPT

## 2022-11-17 PROCEDURE — 87389 HIV-1 AG W/HIV-1&-2 AB AG IA: CPT

## 2022-11-17 PROCEDURE — 85027 COMPLETE CBC AUTOMATED: CPT

## 2022-11-17 PROCEDURE — 70450 CT HEAD/BRAIN W/O DYE: CPT

## 2022-11-17 PROCEDURE — 85379 FIBRIN DEGRADATION QUANT: CPT

## 2022-11-17 PROCEDURE — 86593 SYPHILIS TEST NON-TREP QUANT: CPT

## 2022-11-17 PROCEDURE — 99285 EMERGENCY DEPT VISIT HI MDM: CPT

## 2022-11-17 PROCEDURE — 80053 COMPREHEN METABOLIC PANEL: CPT

## 2022-11-17 PROCEDURE — 82962 GLUCOSE BLOOD TEST: CPT

## 2022-11-17 PROCEDURE — 71045 X-RAY EXAM CHEST 1 VIEW: CPT

## 2022-11-17 PROCEDURE — 82803 BLOOD GASES ANY COMBINATION: CPT

## 2022-11-17 PROCEDURE — 80307 DRUG TEST PRSMV CHEM ANLYZR: CPT

## 2022-11-17 PROCEDURE — 97162 PT EVAL MOD COMPLEX 30 MIN: CPT

## 2022-11-17 PROCEDURE — 95716 VEEG EA 12-26HR CONT MNTR: CPT

## 2022-11-17 PROCEDURE — 86900 BLOOD TYPING SEROLOGIC ABO: CPT

## 2022-11-17 PROCEDURE — 83735 ASSAY OF MAGNESIUM: CPT

## 2022-11-17 PROCEDURE — 96375 TX/PRO/DX INJ NEW DRUG ADDON: CPT

## 2022-11-17 PROCEDURE — 84484 ASSAY OF TROPONIN QUANT: CPT

## 2022-11-17 PROCEDURE — 83605 ASSAY OF LACTIC ACID: CPT

## 2022-11-17 PROCEDURE — 93005 ELECTROCARDIOGRAM TRACING: CPT

## 2022-11-17 PROCEDURE — 83615 LACTATE (LD) (LDH) ENZYME: CPT

## 2022-11-17 PROCEDURE — 84100 ASSAY OF PHOSPHORUS: CPT

## 2022-11-17 PROCEDURE — 85730 THROMBOPLASTIN TIME PARTIAL: CPT

## 2022-11-17 PROCEDURE — 70498 CT ANGIOGRAPHY NECK: CPT

## 2022-11-17 PROCEDURE — 86140 C-REACTIVE PROTEIN: CPT

## 2022-11-17 PROCEDURE — 96374 THER/PROPH/DIAG INJ IV PUSH: CPT

## 2022-11-17 PROCEDURE — 99239 HOSP IP/OBS DSCHRG MGMT >30: CPT | Mod: GC

## 2022-11-17 PROCEDURE — 86850 RBC ANTIBODY SCREEN: CPT

## 2022-11-17 PROCEDURE — 85610 PROTHROMBIN TIME: CPT

## 2022-11-17 PROCEDURE — 86780 TREPONEMA PALLIDUM: CPT

## 2022-11-17 PROCEDURE — 86592 SYPHILIS TEST NON-TREP QUAL: CPT

## 2022-11-17 PROCEDURE — 82728 ASSAY OF FERRITIN: CPT

## 2022-11-17 PROCEDURE — 83036 HEMOGLOBIN GLYCOSYLATED A1C: CPT

## 2022-11-17 PROCEDURE — 80048 BASIC METABOLIC PNL TOTAL CA: CPT

## 2022-11-17 PROCEDURE — 84443 ASSAY THYROID STIM HORMONE: CPT

## 2022-11-17 PROCEDURE — 85025 COMPLETE CBC W/AUTO DIFF WBC: CPT

## 2022-11-17 PROCEDURE — 96372 THER/PROPH/DIAG INJ SC/IM: CPT

## 2022-11-17 PROCEDURE — 0042T: CPT

## 2022-11-17 PROCEDURE — 97116 GAIT TRAINING THERAPY: CPT

## 2022-11-17 PROCEDURE — 82607 VITAMIN B-12: CPT

## 2022-11-17 PROCEDURE — 95700 EEG CONT REC W/VID EEG TECH: CPT

## 2022-11-17 PROCEDURE — 87635 SARS-COV-2 COVID-19 AMP PRB: CPT

## 2022-11-17 PROCEDURE — 70496 CT ANGIOGRAPHY HEAD: CPT

## 2022-11-17 RX ORDER — NIFEDIPINE 30 MG
2 TABLET, EXTENDED RELEASE 24 HR ORAL
Qty: 0 | Refills: 0 | DISCHARGE

## 2022-11-17 RX ORDER — NIFEDIPINE 30 MG
30 TABLET, EXTENDED RELEASE 24 HR ORAL ONCE
Refills: 0 | Status: COMPLETED | OUTPATIENT
Start: 2022-11-17 | End: 2022-11-17

## 2022-11-17 RX ORDER — NIFEDIPINE 30 MG
1 TABLET, EXTENDED RELEASE 24 HR ORAL
Qty: 0 | Refills: 0 | DISCHARGE
Start: 2022-11-17

## 2022-11-17 RX ADMIN — LOSARTAN POTASSIUM 50 MILLIGRAM(S): 100 TABLET, FILM COATED ORAL at 18:22

## 2022-11-17 RX ADMIN — Medication 30 MILLIGRAM(S): at 12:12

## 2022-11-17 RX ADMIN — Medication 25 MILLIGRAM(S): at 12:12

## 2022-11-17 RX ADMIN — Medication 60 MILLIGRAM(S): at 07:01

## 2022-11-17 RX ADMIN — Medication 81 MILLIGRAM(S): at 12:12

## 2022-11-17 NOTE — PROGRESS NOTE ADULT - PROBLEM SELECTOR PLAN 4
Home meds: Metoprolol, Nifedipine, losartan. Slightly hypertensive overnight.   - c/w home metoprolol 25mg qd and losartan 50mg qd  - c/w nifedipine to 60mg qd. Trial additional 30mg today.

## 2022-11-17 NOTE — PROGRESS NOTE ADULT - ATTENDING COMMENTS
88 y/o woman with PMH of Alzheimer's dementia. HLD, HTN presenting with 2 days of confusion, agitation likely in the setting of acute delirium and metabolic encephalopathy 2/2 acute changes in pt's environment, covid infection, and progressing dementia. Pt is on covid isolation, awake alert, responds to questions appropriately. Ready for discharge, auth received

## 2022-11-17 NOTE — PROGRESS NOTE ADULT - SUBJECTIVE AND OBJECTIVE BOX
Physical Medicine and Rehabilitation Progress Note :       Patient is a 87y old  Female who presents with a chief complaint of MEDICAL EVALUATION     (16 Nov 2022 20:16)      HPI:  The following history was obtained from the patient's son and ex-daughter in law as the patient was unable to provide history due to her altered mental status.  86 y/o woman with PMH of Alzheimer's dementia, HLD, HTN presenting with 2 days of confusion, agitation and combativeness. At baseline the patient knows her name and recognizes her family but does not know the date or her home address. She was dropped off by her son 2 days ago to her ex-daughter in law's place as he was travelling. On 11/9 the patient was well, she had dinner at her daughter in law's place and went to bed but was unable to sleep. She remained awake, pacing and talking to herself. She remained awake on the night of 11/9 and did not sleep the next day. According to the daughter in law she was confused on the 11/10 and was attempting to leave the house and would become combative when restrained. At the time she did not complain of any SOB, CP, cough, palpitations, abdominal pain, N/V, diarrhea, constipation, hematochezia, hematuria. hematemesis. However, the daughter noted that had become more wobbly than usual and would need to use the wall to support herself when walking, she did not have any urinary incontinence and was going to the bathroom whenever she needed to urinate, without any frequency, urgency or dysuria. The patient did not sustain any trauma or falls during prior or during this acute change in mental status.   She has not been vaccinated against covid.     VS: T 98, 51-58 Hr, 204-148/63-90, 99% on RA  Labs: CBC and CMP wnl, U/A negative, COVID +  Imaging:   CXR: Cardiomegaly, thoracic aortic calcification. Lungs and mediastinum are unremarkable. Right shoulder degenerative changes.  CT head:   No acute intracranial hemorrhage, mass effect, or CT evidence of recent transcortical infarction.  Interventions: Hydral 10 mg, Olanzapine 5 mg x3, Haloperidol 5 mg x1, versed 2mg x1   (11 Nov 2022 10:38)                Vital Signs Last 24 Hrs  T(C): 36.4 (17 Nov 2022 12:18), Max: 37.1 (16 Nov 2022 21:07)  T(F): 97.6 (17 Nov 2022 12:18), Max: 98.7 (16 Nov 2022 21:07)  HR: 72 (17 Nov 2022 12:18) (65 - 78)  BP: 158/77 (17 Nov 2022 12:18) (148/65 - 168/64)  BP(mean): --  RR: 18 (17 Nov 2022 12:18) (18 - 19)  SpO2: 100% (17 Nov 2022 12:18) (99% - 100%)    Parameters below as of 17 Nov 2022 06:28  Patient On (Oxygen Delivery Method): room air        MEDICATIONS  (STANDING):  aspirin  chewable 81 milliGRAM(s) Oral daily  atorvastatin 40 milliGRAM(s) Oral at bedtime  dextrose 5% + sodium chloride 0.45%. 1000 milliLiter(s) (70 mL/Hr) IV Continuous <Continuous>  dextrose 5%. 1000 milliLiter(s) (50 mL/Hr) IV Continuous <Continuous>  dextrose 5%. 1000 milliLiter(s) (100 mL/Hr) IV Continuous <Continuous>  dextrose 50% Injectable 25 Gram(s) IV Push once  dextrose 50% Injectable 12.5 Gram(s) IV Push once  dextrose 50% Injectable 25 Gram(s) IV Push once  donepezil 10 milliGRAM(s) Oral at bedtime  enoxaparin Injectable 30 milliGRAM(s) SubCutaneous every 24 hours  glucagon  Injectable 1 milliGRAM(s) IntraMuscular once  losartan 50 milliGRAM(s) Oral every 24 hours  metoprolol succinate ER 25 milliGRAM(s) Oral every 24 hours  NIFEdipine XL 60 milliGRAM(s) Oral every 24 hours    MEDICATIONS  (PRN):  dextrose Oral Gel 15 Gram(s) Oral once PRN Blood Glucose LESS THAN 70 milliGRAM(s)/deciliter       Physical Therapy Functional Status Assessment :       Cognitive/Neuro/Behavioral  Level of Consciousness: confused  Arousal Level: arouses to voice  Orientation: disoriented to;  place;  time;  situation  Speech: clear  Mood/Behavior: calm    Language Assistance  Preferred Language to Address Healthcare Preferred Language to Address Healthcare: English    Therapeutic Interventions      Bed Mobility  Bed Mobility Training Symptoms Noted During/After Treatment: none  Bed Mobility Training Rolling/Turning: contact guard;  1 person assist;  verbal cues;  bed rails  Bed Mobility Training Sit-to-Supine: minimum assist (75% patient effort);  1 person assist;  verbal cues;  bed rails  Bed Mobility Training Supine-to-Sit: minimum assist (75% patient effort);  1 person assist;  verbal cues;  bed rails;  HOB elevated  Bed Mobility Training Limitations: decreased ability to use legs for bridging/pushing;  impaired ability to control trunk for mobility;  decreased strength;  impaired balance;  impaired postural control;  cognitive, decreased safety awareness    Sit-Stand Transfer Training  Sit-to-Stand Transfer Training Symptoms Noted During/After Treatment: none  Transfer Training Sit-to-Stand Transfer: minimum assist (75% patient effort);  2 person assist;  verbal cues;  B Handheld Assist   Transfer Training Stand-to-Sit Transfer: minimum assist (75% patient effort);  2 person assist;  verbal cues  Sit-to-Stand Transfer Training Transfer Safety Analysis: decreased balance;  decreased weight-shifting ability;  decreased step length;  decreased strength;  impaired balance;  impaired postural control    Gait Training  Gait Training Symptoms Noted During/After Treatment: none  Gait Training: minimum assist (75% patient effort);  2 person assist;  verbal cues;  B Handheld Assist ;  20 feet x 1  Gait Analysis: decreased osiris;  increased time in double stance;  decreased step length;  decreased weight-shifting ability;  decreased strength;  impaired balance;  impaired postural control;  cognitive, decreased safety awareness  Gait Number of Times:: x 1            PM&R Impression : as above    Current Disposition Plan Recommendations :    subacute rehab placement

## 2022-11-17 NOTE — PROGRESS NOTE ADULT - ASSESSMENT
88 y/o woman with PMH of Alzheimer's dementia. HLD, HTN presenting with 2 days of confusion, agitation likely in the setting of acute delirium and metabolic encephalopathy 2/2 acute changes in pt's environment, covid infection, and progressing dementia, mental status improved, pending LONA.

## 2022-11-17 NOTE — PROGRESS NOTE ADULT - ASSESSMENT
per Internal Medicine    86 y/o woman with PMH of Alzheimer's dementia. HLD, HTN presenting with 2 days of confusion, agitation likely in the setting of acute delirium and metabolic encephalopathy 2/2 acute changes in pt's environment, covid infection, and progressing dementia, mental status improved, pending LONA.         Problem/Plan - 1:  ·  Problem: Metabolic encephalopathy.   ·  Plan: Baseline A&O x1, acute worsening in past 2 days, likely i/s/o change in environment staying with daughter instead of son-in-law, given history of progressive dementia and Alzheimer's disease. W/u for AMS negative for structural causes/ICH, toxic causes, infectious workup negative. COVID+ and unvaccinated. ccb stroke code for decreased responsiveness, - for acute stroke. vEEG: no epileptiform activity.     - f/u MRI brain non-con outpatient (r/o PRES)  - Melatonin 5mg for sleep  - quetiapine 12.5mg BID PRN for agitation  - if still not helping, can give haldol 0.5mg IM.    Problem/Plan - 2:  ·  Problem: 2019 novel coronavirus disease (COVID-19).   ·  Plan: Asymptomatic, not COVID vaccinated. She will need treatment as she is a high risk patient. Unclear if AMS was from COVID encephalopathy. Completed course of Remdesivir 100mg q24h (11/11-11/15)  - continue to monitor O2 requirements.    Problem/Plan - 3:  ·  Problem: Basilar artery stenosis.   ·  Plan: L vertebral artery occlusion  Intracranial left vertebral artery occlusion, multifocal moderate to severe stenosis of the basilar artery, and occlusion of the distal cervical left vertebral artery with multifocal areas of stenosis extending from the vertebral artery origin seen on CTA imaging. Per stroke team, likely chronic in nature.  - c/w atorvastatin 40mg qhs and ASA 81mg qd.    Problem/Plan - 4:  ·  Problem: HTN (hypertension).   ·  Plan: Home meds: Metoprolol, Nifedipine, losartan. Slightly hypertensive overnight.   - c/w home metoprolol 25mg qd and losartan 50mg qd  - c/w nifedipine to 60mg qd. Trial additional 30mg today.    Problem/Plan - 5:  ·  Problem: HLD (hyperlipidemia).   ·  Plan: Home meds: On Rosuvastatin   - c/w Atorvastatin 40 mg daily and ASA 81mg.    Problem/Plan - 6:  ·  Problem: Alzheimer's dementia.   ·  Plan: Home meds: Donepezil  - c/w donepezil 10 mg qhs.    Problem/Plan - 7:  ·  Problem: Prophylactic measure.   ·  Plan: F: None  E: Replenish PRN   Diet: regular diet  DVT: lovenox 30 mg qd  Dispo: RMF.

## 2022-11-17 NOTE — PROGRESS NOTE ADULT - PROBLEM SELECTOR PLAN 1
Baseline A&O x1, acute worsening in past 2 days, likely i/s/o change in environment staying with daughter instead of son-in-law, given history of progressive dementia and Alzheimer's disease. W/u for AMS negative for structural causes/ICH, toxic causes, infectious workup negative. COVID+ and unvaccinated. ccb stroke code for decreased responsiveness, - for acute stroke. vEEG: no epileptiform activity.     - f/u MRI brain non-con outpatient (r/o PRES)  - Melatonin 5mg for sleep  - quetiapine 12.5mg BID PRN for agitation  - if still not helping, can give haldol 0.5mg IM

## 2022-11-17 NOTE — PROGRESS NOTE ADULT - SUBJECTIVE AND OBJECTIVE BOX
OVERNIGHT EVENTS: NINA    SUBJECTIVE:  Patient seen and examined at bedside, conversational, pleasant, A&O x 1, slightly confused.    ROS: Denies chest pain, headache, fever, abdominal pain, dyspnea.     Vital Signs Last 12 Hrs  T(F): 97.4 (11-17-22 @ 06:28), Max: 97.4 (11-17-22 @ 06:28)  HR: 76 (11-17-22 @ 07:00) (65 - 76)  BP: 168/64 (11-17-22 @ 07:00) (160/55 - 168/64)  BP(mean): --  RR: 19 (11-17-22 @ 06:28) (19 - 19)  SpO2: 100% (11-17-22 @ 06:28) (100% - 100%)  I&O's Summary      PHYSICAL EXAM:  Constitutional: resting comfortably in bed; NAD  HEENT: NC/AT, PER, anicteric sclera, no nasal discharge; MMM; neck supple  Respiratory: CTA B/L; no W/R/R, no retractions  Cardiac: +S1/S2; RRR; no M/R/G  Gastrointestinal: soft, NT/ND; no rebound or guarding  Extremities: WWP, no clubbing or cyanosis; no peripheral edema  Musculoskeletal: NROM x4; no joint swelling, tenderness or erythema  Vascular: 2+ radial, DP/PT pulses B/L  Dermatologic: skin warm, dry and intact; no rashes, wounds, or scars  Neurologic: AAOx1; CNII-XII grossly intact; no focal deficits                        RADIOLOGY & ADDITIONAL TESTS:    MEDICATIONS  (STANDING):  aspirin  chewable 81 milliGRAM(s) Oral daily  atorvastatin 40 milliGRAM(s) Oral at bedtime  dextrose 5% + sodium chloride 0.45%. 1000 milliLiter(s) (70 mL/Hr) IV Continuous <Continuous>  dextrose 5%. 1000 milliLiter(s) (50 mL/Hr) IV Continuous <Continuous>  dextrose 5%. 1000 milliLiter(s) (100 mL/Hr) IV Continuous <Continuous>  dextrose 50% Injectable 25 Gram(s) IV Push once  dextrose 50% Injectable 12.5 Gram(s) IV Push once  dextrose 50% Injectable 25 Gram(s) IV Push once  donepezil 10 milliGRAM(s) Oral at bedtime  enoxaparin Injectable 30 milliGRAM(s) SubCutaneous every 24 hours  glucagon  Injectable 1 milliGRAM(s) IntraMuscular once  losartan 50 milliGRAM(s) Oral every 24 hours  metoprolol succinate ER 25 milliGRAM(s) Oral every 24 hours  NIFEdipine XL 60 milliGRAM(s) Oral every 24 hours  NIFEdipine XL 30 milliGRAM(s) Oral once    MEDICATIONS  (PRN):  dextrose Oral Gel 15 Gram(s) Oral once PRN Blood Glucose LESS THAN 70 milliGRAM(s)/deciliter

## 2022-11-17 NOTE — PROGRESS NOTE ADULT - PROBLEM SELECTOR PLAN 2
Asymptomatic, not COVID vaccinated. She will need treatment as she is a high risk patient. Unclear if AMS was from COVID encephalopathy. Completed course of Remdesivir 100mg q24h (11/11-11/15)  - continue to monitor O2 requirements.

## 2022-11-17 NOTE — PROGRESS NOTE ADULT - PROVIDER SPECIALTY LIST ADULT
Internal Medicine
Rehab Medicine
Rehab Medicine
Internal Medicine

## 2022-11-17 NOTE — PROGRESS NOTE ADULT - PROBLEM SELECTOR PROBLEM 1
Toxic metabolic encephalopathy
Metabolic encephalopathy

## 2022-11-24 DIAGNOSIS — Z79.82 LONG TERM (CURRENT) USE OF ASPIRIN: ICD-10-CM

## 2022-11-24 DIAGNOSIS — G30.9 ALZHEIMER'S DISEASE, UNSPECIFIED: ICD-10-CM

## 2022-11-24 DIAGNOSIS — R53.81 OTHER MALAISE: ICD-10-CM

## 2022-11-24 DIAGNOSIS — U07.1 COVID-19: ICD-10-CM

## 2022-11-24 DIAGNOSIS — Z28.310 UNVACCINATED FOR COVID-19: ICD-10-CM

## 2022-11-24 DIAGNOSIS — T43.595A ADVERSE EFFECT OF OTHER ANTIPSYCHOTICS AND NEUROLEPTICS, INITIAL ENCOUNTER: ICD-10-CM

## 2022-11-24 DIAGNOSIS — E78.5 HYPERLIPIDEMIA, UNSPECIFIED: ICD-10-CM

## 2022-11-24 DIAGNOSIS — R41.82 ALTERED MENTAL STATUS, UNSPECIFIED: ICD-10-CM

## 2022-11-24 DIAGNOSIS — Z51.5 ENCOUNTER FOR PALLIATIVE CARE: ICD-10-CM

## 2022-11-24 DIAGNOSIS — I65.1 OCCLUSION AND STENOSIS OF BASILAR ARTERY: ICD-10-CM

## 2022-11-24 DIAGNOSIS — G92.8 OTHER TOXIC ENCEPHALOPATHY: ICD-10-CM

## 2022-11-24 DIAGNOSIS — I10 ESSENTIAL (PRIMARY) HYPERTENSION: ICD-10-CM

## 2022-11-24 DIAGNOSIS — F02.811 DEMENTIA IN OTHER DISEASES CLASSIFIED ELSEWHERE, UNSPECIFIED SEVERITY, WITH AGITATION: ICD-10-CM

## 2022-11-24 DIAGNOSIS — G47.00 INSOMNIA, UNSPECIFIED: ICD-10-CM

## 2023-09-26 NOTE — STROKE CODE NOTE - NIH STROKE SCALE: 7. LIMB ATAXIA, QM
chest wall non-tender, breathing is unlabored without accessory muscle use, normal breath sounds (0) Absent

## 2025-01-15 NOTE — BH CONSULTATION LIAISON ASSESSMENT NOTE - NSICDXPASTMEDICALHX_GEN_ALL_CORE_FT
ASSESSMENT:     Right groin pain  -  Worse with flexion and IR/ER of hip joint  - Likely hip OA  - Will start with hip xray    The patient has groin pain, which is worsened with flexion and internal/external rotation of the hip joint. I feel that some of the low back pain may be referred from the hip. I will start with a fluoroscopic guided intra-articular hip injection for diagnostic purposes.  If the pain is significantly better with the local anesthetic portion of the injection, then the hip, and not the low back, is the primary pain generator.    Post herpetic neuralgia, left neck into the shoulder  Well controlled with gabapentin    PLAN:  1) Medical Modalities:     Discussed trying 2ES Tylenol TID PRN in place of oral NSAID due to risks of MI, CVA, GI ulceration with oral NSAIDs.      Can switch from paola to cymbalta for post herpetic neuralgia, she is going to think about    2) Interventional Modalities:     Right hip IA  Hold  Aleve    3) Behavioral Medicine Modalities:    - None    4) Other Modalities:   - None    5) Imaging/Labs:   - None    HISTORY OF PRESENT ILLNESS:  The patient presents with low back pain which began 8/24.   No inciting event recalled    The pain radiates into the right leg.      The pain is described as sharp in character.       Patient reports that the pain is worse with activity. The pain is better with rest.    The patient denies numbness.  The patient reports tingling in the same distribution.  The patient denies weakness.    Comorbid conditions:  Hyperlipidemia  CAD  PAD  Thyroid disease  GERD  Sicca syndrome, Sjogrens    PAIN COURSE AND PREVIOUS INTERVENTIONS:  Medications:  Gabapentin 500 mg QID  Aleve    Ineffective/not tolerated:    Adjuvants:  PT    Interventions:    none    PHYSICAL EXAMINATION:      MOTOR EXAMINATION:  LOWER EXTREMITY      LEFT RIGHT   Iliopsoas 5/5 5/5   Quadriceps 5/5 5/5   Hamstrings 5/5 5/5   Foot Dorsiflexion 5/5 5/5   Extensor hallucis longus 5/5 5/5    Gastrocnemius 5/5 5/5        Pain in Right groin with hip abduction/adduction/internal rotation/external rotation Present  Pain in Left groin with hip abduction/adduction/internal rotation/external rotation Absent      IMAGING:  10/24 x-ray right hip  IMPRESSION: Mild narrowing of the hip joint. I see no fracture.       10/24 xray L spine  IMPRESSION:    1.  Advanced multilevel spondylosis and lower lumbar predominant facet  arthropathy most pronounced at L3-L4 and L4-L5, progressed since 2015.  2.  Grade 1 anterolisthesis of L4 and L5.  3.  Moderate levoscoliosis apex L3.    __________________________________________________  The patient was seen in consultation at the request of Alana Boland MD for chronic pain.    - A copy of this note was sent to them.  I reviewed the patients recent labwork as part of my decision making process.  I reviewed and summarized old medical records in assessing the patient today.       PAST MEDICAL HISTORY:  Dementia